# Patient Record
Sex: FEMALE | Race: WHITE | NOT HISPANIC OR LATINO | Employment: FULL TIME | URBAN - METROPOLITAN AREA
[De-identification: names, ages, dates, MRNs, and addresses within clinical notes are randomized per-mention and may not be internally consistent; named-entity substitution may affect disease eponyms.]

---

## 2018-06-18 ENCOUNTER — EMPLOYEE HEALTH (OUTPATIENT)
Dept: OCCUPATIONAL MEDICINE | Facility: CLINIC | Age: 28
End: 2018-06-18

## 2018-06-18 ENCOUNTER — EH NON-PROVIDER (OUTPATIENT)
Dept: OCCUPATIONAL MEDICINE | Facility: CLINIC | Age: 28
End: 2018-06-18

## 2018-06-18 ENCOUNTER — HOSPITAL ENCOUNTER (OUTPATIENT)
Facility: MEDICAL CENTER | Age: 28
End: 2018-06-18
Attending: PREVENTIVE MEDICINE
Payer: COMMERCIAL

## 2018-06-18 VITALS
BODY MASS INDEX: 26.12 KG/M2 | SYSTOLIC BLOOD PRESSURE: 118 MMHG | DIASTOLIC BLOOD PRESSURE: 78 MMHG | HEIGHT: 64 IN | OXYGEN SATURATION: 97 % | TEMPERATURE: 98.8 F | HEART RATE: 63 BPM | WEIGHT: 153 LBS

## 2018-06-18 DIAGNOSIS — Z02.1 PRE-EMPLOYMENT DRUG SCREENING: ICD-10-CM

## 2018-06-18 DIAGNOSIS — Z02.89 VISIT FOR OCCUPATIONAL HEALTH EXAMINATION: ICD-10-CM

## 2018-06-18 LAB
AMP AMPHETAMINE: NORMAL
BAR BARBITURATES: NORMAL
BZO BENZODIAZEPINES: NORMAL
COC COCAINE: NORMAL
INT CON NEG: NORMAL
INT CON POS: NORMAL
MDMA ECSTASY: NORMAL
MET METHAMPHETAMINES: NORMAL
MTD METHADONE: NORMAL
OPI OPIATES: NORMAL
OXY OXYCODONE: NORMAL
PCP PHENCYCLIDINE: NORMAL
POC URINE DRUG SCREEN OCDRS: NORMAL
THC: NORMAL
VZV IGG SER IA-ACNC: 1.89

## 2018-06-18 PROCEDURE — 86787 VARICELLA-ZOSTER ANTIBODY: CPT | Performed by: PREVENTIVE MEDICINE

## 2018-06-18 PROCEDURE — 8915 PR COMPREHENSIVE PHYSICAL: Performed by: PREVENTIVE MEDICINE

## 2018-06-18 PROCEDURE — 86480 TB TEST CELL IMMUN MEASURE: CPT | Performed by: PREVENTIVE MEDICINE

## 2018-06-18 PROCEDURE — 94375 RESPIRATORY FLOW VOLUME LOOP: CPT | Performed by: PREVENTIVE MEDICINE

## 2018-06-18 PROCEDURE — 80305 DRUG TEST PRSMV DIR OPT OBS: CPT | Performed by: PREVENTIVE MEDICINE

## 2018-06-20 LAB
M TB TUBERC IFN-G BLD QL: NEGATIVE
M TB TUBERC IFN-G/MITOGEN IGNF BLD: 0.01
M TB TUBERC IGNF/MITOGEN IGNF CONTROL: 51.07 [IU]/ML
MITOGEN IGNF BCKGRD COR BLD-ACNC: 0.02 [IU]/ML

## 2018-06-22 ENCOUNTER — DOCUMENTATION (OUTPATIENT)
Dept: OCCUPATIONAL MEDICINE | Facility: CLINIC | Age: 28
End: 2018-06-22

## 2018-10-02 ENCOUNTER — IMMUNIZATION (OUTPATIENT)
Dept: OCCUPATIONAL MEDICINE | Facility: CLINIC | Age: 28
End: 2018-10-02

## 2018-10-02 DIAGNOSIS — Z23 NEED FOR VACCINATION: ICD-10-CM

## 2018-10-02 PROCEDURE — 90686 IIV4 VACC NO PRSV 0.5 ML IM: CPT | Performed by: PREVENTIVE MEDICINE

## 2019-01-02 ENCOUNTER — DOCUMENTATION (OUTPATIENT)
Dept: OCCUPATIONAL MEDICINE | Facility: CLINIC | Age: 29
End: 2019-01-02

## 2019-01-02 NOTE — PROGRESS NOTES
Respiratory questionnaire reviewed and signed. See scanned documents.      OK for mask fit event.   Appointment scheduled for 1/4/19 @ 6:30 am. E-mail notification sent.

## 2019-01-03 ENCOUNTER — NON-PROVIDER VISIT (OUTPATIENT)
Dept: URGENT CARE | Facility: PHYSICIAN GROUP | Age: 29
End: 2019-01-03

## 2019-01-03 DIAGNOSIS — Z11.1 SCREENING FOR TUBERCULOSIS: Primary | ICD-10-CM

## 2019-01-03 PROCEDURE — 86580 TB INTRADERMAL TEST: CPT | Performed by: PHYSICIAN ASSISTANT

## 2019-01-03 NOTE — PROGRESS NOTES
Agustín Brito is a 28 y.o. female here for a non-provider visit for PPD placement -- Step 1 of 2    Reason for PPD:  school requirement    1. TB evaluation questionnaire completed by patient? Yes      -  If any answers marked yes did you contact a provider prior to placing? No  2.  Patient notified to return to clinic for reading on: 01/05/2019  3.  PPD Placement documentation completed on TB evaluation questionnaire? Yes  4.  Location of TB evaluation questionnaire filed: back office

## 2019-01-04 ENCOUNTER — EH NON-PROVIDER (OUTPATIENT)
Dept: OCCUPATIONAL MEDICINE | Facility: CLINIC | Age: 29
End: 2019-01-04

## 2019-01-04 DIAGNOSIS — Z02.89 ENCOUNTER FOR OCCUPATIONAL HEALTH EXAMINATION INVOLVING RESPIRATOR: Primary | ICD-10-CM

## 2019-01-04 PROCEDURE — 94375 RESPIRATORY FLOW VOLUME LOOP: CPT | Performed by: PREVENTIVE MEDICINE

## 2019-01-05 ENCOUNTER — NON-PROVIDER VISIT (OUTPATIENT)
Dept: URGENT CARE | Facility: PHYSICIAN GROUP | Age: 29
End: 2019-01-05
Payer: OTHER MISCELLANEOUS

## 2019-01-05 LAB — TB WHEAL 3D P 5 TU DIAM: 0 MM

## 2019-06-11 ENCOUNTER — HOSPITAL ENCOUNTER (EMERGENCY)
Facility: MEDICAL CENTER | Age: 29
End: 2019-06-11
Attending: EMERGENCY MEDICINE
Payer: MEDICAID

## 2019-06-11 VITALS
HEIGHT: 64 IN | DIASTOLIC BLOOD PRESSURE: 73 MMHG | BODY MASS INDEX: 29.77 KG/M2 | RESPIRATION RATE: 16 BRPM | SYSTOLIC BLOOD PRESSURE: 109 MMHG | WEIGHT: 174.38 LBS | TEMPERATURE: 97.7 F | HEART RATE: 76 BPM | OXYGEN SATURATION: 95 %

## 2019-06-11 DIAGNOSIS — M25.562 ACUTE PAIN OF LEFT KNEE: ICD-10-CM

## 2019-06-11 DIAGNOSIS — S89.92XA INJURY OF LEFT KNEE, INITIAL ENCOUNTER: ICD-10-CM

## 2019-06-11 PROCEDURE — 99283 EMERGENCY DEPT VISIT LOW MDM: CPT

## 2019-06-11 NOTE — ED TRIAGE NOTES
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  /79   Pulse 88   Temp 36.8 °C (98.3 °F) (Temporal)   Resp 17   Ht 1.626 m (5' 4\")   Wt 79.1 kg (174 lb 6.1 oz)   SpO2 100%   Pt informed of wait times. Educated on triage process.  Asked to return to triage RN for any new or worsening of symptoms. Thanked for patience.        "

## 2019-06-12 NOTE — ED NOTES
Pt cleared for d/c  Order form for MRI given and MD placed order in EPIC to have out pt   dischg instructions given to pt  Verbally understands  D/c'ed to home in NAD

## 2019-06-12 NOTE — ED PROVIDER NOTES
ED Provider Note    CHIEF COMPLAINT  Chief Complaint   Patient presents with   • T-5000 GLF     L knee after GLF 2 weeks ago   • Knee Injury       HPI  Agustín Brito is a 28 y.o. female who presents complaining of left knee pain after falling at a party about 2 weeks ago.  She has had intermittent swelling to her left knee and some pain going up stairs.  She denies any numbness or tingling distally.  She did get an x-ray of her knee at [facility] a week and a half ago and there were no fractures.  She states that it still intermittently swells and hurts.  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  The patient presents to the ER for an orthopedic referral.  She is complaining of some swelling in the knee pain with range of motion.    REVIEW OF SYSTEMS  No history of poor wound healing diabetes or bony abnormalities     PAST MEDICAL HISTORY  History reviewed. No pertinent past medical history.      SOCIAL HISTORY  Social History     Social History   • Marital status: Single     Spouse name: N/A   • Number of children: N/A   • Years of education: N/A     Social History Main Topics   • Smoking status: Never Smoker   • Smokeless tobacco: Never Used   • Alcohol use 1.2 oz/week     2 Glasses of wine per week   • Drug use: No   • Sexual activity: Not on file     Other Topics Concern   • Not on file     Social History Narrative   • No narrative on file       CURRENT MEDICATIONS  Home Medications     Reviewed by Leona Perry R.N. (Registered Nurse) on 06/11/19 at 1633  Med List Status: Partial   Medication Last Dose Status   fluconazole (DIFLUCAN) 150 MG tablet  Active   metronidazole (METROGEL-VAGINAL) 0.75 % GEL  Active   Norgestim-Eth Estrad Triphasic (TRI-SPRINTEC) 0.18/0.215/0.25 MG-35 MCG TABS  Active                ALLERGIES  Allergies   Allergen Reactions   • Penicillins  "Hives       PHYSICAL EXAM  VITAL SIGNS: /79   Pulse 88   Temp 36.8 °C (98.3 °F) (Temporal)   Resp 17   Ht 1.626 m (5' 4\")   Wt 79.1 kg (174 lb 6.1 oz)   SpO2 100%   BMI 29.93 kg/m²    Constitutional: No distress  Skin: Pink warm and dry without contusions abrasions or erythema  Musculoskeletal: Patient has small effusion to her left knee with tenderness on range of motion.  She has equal strength and sensation in a small amount of an anterior drawer sign.  Distally she is neurovascularly intact with normal strength sensation and tendon function.  Left ankle and left hip are normal to examination.  Vascular: warm to touch good capillary refill   Neurologic: distally neurovascularly intact  Psychiatric: Affect normal    Radiology  None indicated    Medical Decision Making  Differential diagnosis: Knee sprain versus renal knee derangement      I explained to the patient that we do not perform knee MRIs through the emergency department but I could order one as an outpatient.  I will refer her to [facility] orthopedics Dr. Kurtz who is on-call for us today to follow-up on these results and perform any surgeries necessary and recheck her knee.  The patient understands this and she will be discharged home in stable condition.  She is to continue to wear the knee brace that she has been wearing and avoid heavy exertion on that leg.  She should take Motrin and ice and elevate the leg for pain and swelling.        Patient has had high blood pressure while in the emergency department, felt likely secondary to medical condition. Counseled patient to monitor blood pressure at home and follow up with primary care physician.    Ravindra Minor M.D.  [address]  [address]  [facility] [id number] [phone]    Call in 1 day  to establish care, for recheck    Current Outpatient Prescriptions   Medication Sig Dispense Refill   • fluconazole (DIFLUCAN) 150 MG tablet Take 1 tablet by mouth every 4 days 4 " Tab 0   • metronidazole (METROGEL-VAGINAL) 0.75 % GEL Insert 1 Applicator in vagina every bedtime. 1 Tube 0   • Norgestim-Eth Estrad Triphasic (TRI-SPRINTEC) 0.18/0.215/0.25 MG-35 MCG TABS Take  by mouth.             Diagnosis  1. Injury of left knee, initial encounter

## 2019-06-20 ENCOUNTER — HOSPITAL ENCOUNTER (OUTPATIENT)
Dept: RADIOLOGY | Facility: MEDICAL CENTER | Age: 29
End: 2019-06-20
Attending: EMERGENCY MEDICINE
Payer: MEDICAID

## 2019-06-20 DIAGNOSIS — M25.562 ACUTE PAIN OF LEFT KNEE: ICD-10-CM

## 2019-06-20 PROCEDURE — 73721 MRI JNT OF LWR EXTRE W/O DYE: CPT | Mod: LT

## 2019-07-08 DIAGNOSIS — Z01.812 PRE-OPERATIVE LABORATORY EXAMINATION: ICD-10-CM

## 2019-07-08 LAB — HCG SERPL QL: NEGATIVE

## 2019-07-08 PROCEDURE — 36415 COLL VENOUS BLD VENIPUNCTURE: CPT

## 2019-07-08 PROCEDURE — 84703 CHORIONIC GONADOTROPIN ASSAY: CPT

## 2019-07-08 PROCEDURE — 87640 STAPH A DNA AMP PROBE: CPT | Mod: 25

## 2019-07-08 PROCEDURE — 87641 MR-STAPH DNA AMP PROBE: CPT

## 2019-07-08 RX ORDER — IBUPROFEN 200 MG
200 TABLET ORAL EVERY 6 HOURS PRN
COMMUNITY
End: 2021-03-17

## 2019-07-08 NOTE — OR NURSING
"Preadmit appointment: \" Preparing for your Procedure information\" sheet given to patient with verbal and written instructions. Patient instructed to continue prescribed medications through the day before surgery, instructed to take the following medications the day of surgery per anesthesia protocol:  None.  Pt states no issues with anesthesia or family history.   All questions answered.  "

## 2019-07-09 ENCOUNTER — HOSPITAL ENCOUNTER (OUTPATIENT)
Facility: MEDICAL CENTER | Age: 29
End: 2019-07-09
Attending: ORTHOPAEDIC SURGERY | Admitting: ORTHOPAEDIC SURGERY
Payer: MEDICAID

## 2019-07-09 ENCOUNTER — ANESTHESIA (OUTPATIENT)
Dept: SURGERY | Facility: MEDICAL CENTER | Age: 29
End: 2019-07-09
Payer: MEDICAID

## 2019-07-09 ENCOUNTER — ANESTHESIA EVENT (OUTPATIENT)
Dept: SURGERY | Facility: MEDICAL CENTER | Age: 29
End: 2019-07-09
Payer: MEDICAID

## 2019-07-09 VITALS
OXYGEN SATURATION: 96 % | HEIGHT: 64 IN | SYSTOLIC BLOOD PRESSURE: 143 MMHG | DIASTOLIC BLOOD PRESSURE: 89 MMHG | HEART RATE: 84 BPM | TEMPERATURE: 97.9 F | WEIGHT: 171.96 LBS | RESPIRATION RATE: 18 BRPM | BODY MASS INDEX: 29.36 KG/M2

## 2019-07-09 LAB
SCCMEC + MECA PNL NOSE NAA+PROBE: NEGATIVE
SCCMEC + MECA PNL NOSE NAA+PROBE: POSITIVE

## 2019-07-09 PROCEDURE — 160029 HCHG SURGERY MINUTES - 1ST 30 MINS LEVEL 4: Performed by: ORTHOPAEDIC SURGERY

## 2019-07-09 PROCEDURE — C1762 CONN TISS, HUMAN(INC FASCIA): HCPCS | Performed by: ORTHOPAEDIC SURGERY

## 2019-07-09 PROCEDURE — 700101 HCHG RX REV CODE 250

## 2019-07-09 PROCEDURE — 700105 HCHG RX REV CODE 258: Performed by: ORTHOPAEDIC SURGERY

## 2019-07-09 PROCEDURE — 700111 HCHG RX REV CODE 636 W/ 250 OVERRIDE (IP): Performed by: ANESTHESIOLOGY

## 2019-07-09 PROCEDURE — 160002 HCHG RECOVERY MINUTES (STAT): Performed by: ORTHOPAEDIC SURGERY

## 2019-07-09 PROCEDURE — 160046 HCHG PACU - 1ST 60 MINS PHASE II: Performed by: ORTHOPAEDIC SURGERY

## 2019-07-09 PROCEDURE — 700101 HCHG RX REV CODE 250: Performed by: ORTHOPAEDIC SURGERY

## 2019-07-09 PROCEDURE — 160048 HCHG OR STATISTICAL LEVEL 1-5: Performed by: ORTHOPAEDIC SURGERY

## 2019-07-09 PROCEDURE — 500028 HCHG ARTHROWAND TURBOVAC 3.5/90 SUCT.: Performed by: ORTHOPAEDIC SURGERY

## 2019-07-09 PROCEDURE — 500562 HCHG FIBERWIRE: Performed by: ORTHOPAEDIC SURGERY

## 2019-07-09 PROCEDURE — 501838 HCHG SUTURE GENERAL: Performed by: ORTHOPAEDIC SURGERY

## 2019-07-09 PROCEDURE — C1713 ANCHOR/SCREW BN/BN,TIS/BN: HCPCS | Performed by: ORTHOPAEDIC SURGERY

## 2019-07-09 PROCEDURE — 160047 HCHG PACU  - EA ADDL 30 MINS PHASE II: Performed by: ORTHOPAEDIC SURGERY

## 2019-07-09 PROCEDURE — 160035 HCHG PACU - 1ST 60 MINS PHASE I: Performed by: ORTHOPAEDIC SURGERY

## 2019-07-09 PROCEDURE — 160036 HCHG PACU - EA ADDL 30 MINS PHASE I: Performed by: ORTHOPAEDIC SURGERY

## 2019-07-09 PROCEDURE — 700101 HCHG RX REV CODE 250: Performed by: ANESTHESIOLOGY

## 2019-07-09 PROCEDURE — A9270 NON-COVERED ITEM OR SERVICE: HCPCS | Performed by: ANESTHESIOLOGY

## 2019-07-09 PROCEDURE — 160025 RECOVERY II MINUTES (STATS): Performed by: ORTHOPAEDIC SURGERY

## 2019-07-09 PROCEDURE — 502580 HCHG PACK, KNEE ARTHROSCOPY: Performed by: ORTHOPAEDIC SURGERY

## 2019-07-09 PROCEDURE — 160009 HCHG ANES TIME/MIN: Performed by: ORTHOPAEDIC SURGERY

## 2019-07-09 PROCEDURE — 700102 HCHG RX REV CODE 250 W/ 637 OVERRIDE(OP): Performed by: ANESTHESIOLOGY

## 2019-07-09 PROCEDURE — 160041 HCHG SURGERY MINUTES - EA ADDL 1 MIN LEVEL 4: Performed by: ORTHOPAEDIC SURGERY

## 2019-07-09 PROCEDURE — 500423 HCHG DRESSING, ABD COMBINE: Performed by: ORTHOPAEDIC SURGERY

## 2019-07-09 PROCEDURE — 700111 HCHG RX REV CODE 636 W/ 250 OVERRIDE (IP): Performed by: ORTHOPAEDIC SURGERY

## 2019-07-09 DEVICE — SCREW BIOSURE 10 X 25MM: Type: IMPLANTABLE DEVICE | Site: KNEE | Status: FUNCTIONAL

## 2019-07-09 DEVICE — DEVICE ULTRABUTTON ADJUSTABLE FIXATION: Type: IMPLANTABLE DEVICE | Site: KNEE | Status: FUNCTIONAL

## 2019-07-09 DEVICE — GRAFT SOFT TISSUE ANTERIOR TIBIALIS TENDON 23CM: Type: IMPLANTABLE DEVICE | Site: KNEE | Status: FUNCTIONAL

## 2019-07-09 RX ORDER — GABAPENTIN 300 MG/1
300 CAPSULE ORAL ONCE
Status: COMPLETED | OUTPATIENT
Start: 2019-07-09 | End: 2019-07-09

## 2019-07-09 RX ORDER — ONDANSETRON 2 MG/ML
INJECTION INTRAMUSCULAR; INTRAVENOUS PRN
Status: DISCONTINUED | OUTPATIENT
Start: 2019-07-09 | End: 2019-07-09 | Stop reason: SURG

## 2019-07-09 RX ORDER — MEPERIDINE HYDROCHLORIDE 25 MG/ML
12.5 INJECTION INTRAMUSCULAR; INTRAVENOUS; SUBCUTANEOUS
Status: DISCONTINUED | OUTPATIENT
Start: 2019-07-09 | End: 2019-07-09 | Stop reason: HOSPADM

## 2019-07-09 RX ORDER — OXYCODONE HCL 5 MG/5 ML
5 SOLUTION, ORAL ORAL
Status: DISCONTINUED | OUTPATIENT
Start: 2019-07-09 | End: 2019-07-09 | Stop reason: HOSPADM

## 2019-07-09 RX ORDER — HYDROMORPHONE HYDROCHLORIDE 1 MG/ML
0.1 INJECTION, SOLUTION INTRAMUSCULAR; INTRAVENOUS; SUBCUTANEOUS
Status: DISCONTINUED | OUTPATIENT
Start: 2019-07-09 | End: 2019-07-09 | Stop reason: HOSPADM

## 2019-07-09 RX ORDER — SODIUM CHLORIDE, SODIUM LACTATE, POTASSIUM CHLORIDE, CALCIUM CHLORIDE 600; 310; 30; 20 MG/100ML; MG/100ML; MG/100ML; MG/100ML
INJECTION, SOLUTION INTRAVENOUS CONTINUOUS
Status: DISCONTINUED | OUTPATIENT
Start: 2019-07-09 | End: 2019-07-09 | Stop reason: HOSPADM

## 2019-07-09 RX ORDER — OXYCODONE HCL 20 MG/1
TABLET, FILM COATED, EXTENDED RELEASE ORAL
Status: DISCONTINUED
Start: 2019-07-09 | End: 2019-07-09 | Stop reason: HOSPADM

## 2019-07-09 RX ORDER — LABETALOL HYDROCHLORIDE 5 MG/ML
5 INJECTION, SOLUTION INTRAVENOUS
Status: DISCONTINUED | OUTPATIENT
Start: 2019-07-09 | End: 2019-07-09 | Stop reason: HOSPADM

## 2019-07-09 RX ORDER — BUPIVACAINE HYDROCHLORIDE 5 MG/ML
INJECTION, SOLUTION PERINEURAL
Status: DISCONTINUED | OUTPATIENT
Start: 2019-07-09 | End: 2019-07-09 | Stop reason: HOSPADM

## 2019-07-09 RX ORDER — CELECOXIB 200 MG/1
200 CAPSULE ORAL ONCE
Status: COMPLETED | OUTPATIENT
Start: 2019-07-09 | End: 2019-07-09

## 2019-07-09 RX ORDER — OXYCODONE HCL 20 MG/1
20 TABLET, FILM COATED, EXTENDED RELEASE ORAL ONCE
Status: COMPLETED | OUTPATIENT
Start: 2019-07-09 | End: 2019-07-09

## 2019-07-09 RX ORDER — HYDROMORPHONE HYDROCHLORIDE 1 MG/ML
0.2 INJECTION, SOLUTION INTRAMUSCULAR; INTRAVENOUS; SUBCUTANEOUS
Status: DISCONTINUED | OUTPATIENT
Start: 2019-07-09 | End: 2019-07-09 | Stop reason: HOSPADM

## 2019-07-09 RX ORDER — OXYCODONE HCL 5 MG/5 ML
10 SOLUTION, ORAL ORAL
Status: DISCONTINUED | OUTPATIENT
Start: 2019-07-09 | End: 2019-07-09 | Stop reason: HOSPADM

## 2019-07-09 RX ORDER — HYDROMORPHONE HYDROCHLORIDE 1 MG/ML
0.4 INJECTION, SOLUTION INTRAMUSCULAR; INTRAVENOUS; SUBCUTANEOUS
Status: DISCONTINUED | OUTPATIENT
Start: 2019-07-09 | End: 2019-07-09 | Stop reason: HOSPADM

## 2019-07-09 RX ORDER — ACETAMINOPHEN 500 MG
TABLET ORAL
Status: DISCONTINUED
Start: 2019-07-09 | End: 2019-07-09 | Stop reason: HOSPADM

## 2019-07-09 RX ORDER — ACETAMINOPHEN 500 MG
1000 TABLET ORAL ONCE
Status: COMPLETED | OUTPATIENT
Start: 2019-07-09 | End: 2019-07-09

## 2019-07-09 RX ORDER — CEFAZOLIN SODIUM 1 G/3ML
INJECTION, POWDER, FOR SOLUTION INTRAMUSCULAR; INTRAVENOUS PRN
Status: DISCONTINUED | OUTPATIENT
Start: 2019-07-09 | End: 2019-07-09 | Stop reason: SURG

## 2019-07-09 RX ORDER — DIPHENHYDRAMINE HYDROCHLORIDE 50 MG/ML
12.5 INJECTION INTRAMUSCULAR; INTRAVENOUS
Status: DISCONTINUED | OUTPATIENT
Start: 2019-07-09 | End: 2019-07-09 | Stop reason: HOSPADM

## 2019-07-09 RX ORDER — GABAPENTIN 300 MG/1
CAPSULE ORAL
Status: DISCONTINUED
Start: 2019-07-09 | End: 2019-07-09 | Stop reason: HOSPADM

## 2019-07-09 RX ORDER — ONDANSETRON 2 MG/ML
4 INJECTION INTRAMUSCULAR; INTRAVENOUS
Status: DISCONTINUED | OUTPATIENT
Start: 2019-07-09 | End: 2019-07-09 | Stop reason: HOSPADM

## 2019-07-09 RX ORDER — DEXAMETHASONE SODIUM PHOSPHATE 4 MG/ML
INJECTION, SOLUTION INTRA-ARTICULAR; INTRALESIONAL; INTRAMUSCULAR; INTRAVENOUS; SOFT TISSUE PRN
Status: DISCONTINUED | OUTPATIENT
Start: 2019-07-09 | End: 2019-07-09 | Stop reason: SURG

## 2019-07-09 RX ORDER — BACITRACIN 50000 [IU]/1
INJECTION, POWDER, FOR SOLUTION INTRAMUSCULAR
Status: DISCONTINUED | OUTPATIENT
Start: 2019-07-09 | End: 2019-07-09 | Stop reason: HOSPADM

## 2019-07-09 RX ORDER — CELECOXIB 200 MG/1
CAPSULE ORAL
Status: DISCONTINUED
Start: 2019-07-09 | End: 2019-07-09 | Stop reason: HOSPADM

## 2019-07-09 RX ADMIN — PROPOFOL 140 MG: 10 INJECTION, EMULSION INTRAVENOUS at 14:20

## 2019-07-09 RX ADMIN — SODIUM CHLORIDE, POTASSIUM CHLORIDE, SODIUM LACTATE AND CALCIUM CHLORIDE: 600; 310; 30; 20 INJECTION, SOLUTION INTRAVENOUS at 12:21

## 2019-07-09 RX ADMIN — ACETAMINOPHEN 1000 MG: 500 TABLET, FILM COATED ORAL at 13:09

## 2019-07-09 RX ADMIN — FENTANYL CITRATE 50 MCG: 50 INJECTION, SOLUTION INTRAMUSCULAR; INTRAVENOUS at 15:40

## 2019-07-09 RX ADMIN — FENTANYL CITRATE 25 MCG: 50 INJECTION INTRAMUSCULAR; INTRAVENOUS at 16:30

## 2019-07-09 RX ADMIN — GABAPENTIN 300 MG: 300 CAPSULE ORAL at 13:10

## 2019-07-09 RX ADMIN — FENTANYL CITRATE 50 MCG: 50 INJECTION, SOLUTION INTRAMUSCULAR; INTRAVENOUS at 14:55

## 2019-07-09 RX ADMIN — FENTANYL CITRATE 50 MCG: 50 INJECTION, SOLUTION INTRAMUSCULAR; INTRAVENOUS at 14:20

## 2019-07-09 RX ADMIN — FENTANYL CITRATE 50 MCG: 50 INJECTION, SOLUTION INTRAMUSCULAR; INTRAVENOUS at 14:30

## 2019-07-09 RX ADMIN — FENTANYL CITRATE 50 MCG: 50 INJECTION, SOLUTION INTRAMUSCULAR; INTRAVENOUS at 15:16

## 2019-07-09 RX ADMIN — LIDOCAINE HYDROCHLORIDE 0.5 ML: 10 INJECTION, SOLUTION INFILTRATION; PERINEURAL at 12:21

## 2019-07-09 RX ADMIN — SODIUM CHLORIDE, POTASSIUM CHLORIDE, SODIUM LACTATE AND CALCIUM CHLORIDE: 600; 310; 30; 20 INJECTION, SOLUTION INTRAVENOUS at 15:25

## 2019-07-09 RX ADMIN — FENTANYL CITRATE 25 MCG: 50 INJECTION INTRAMUSCULAR; INTRAVENOUS at 16:14

## 2019-07-09 RX ADMIN — LIDOCAINE HYDROCHLORIDE 40 MG: 20 INJECTION, SOLUTION INFILTRATION; PERINEURAL at 14:20

## 2019-07-09 RX ADMIN — FENTANYL CITRATE 25 MCG: 50 INJECTION INTRAMUSCULAR; INTRAVENOUS at 16:20

## 2019-07-09 RX ADMIN — FENTANYL CITRATE 25 MCG: 50 INJECTION INTRAMUSCULAR; INTRAVENOUS at 16:39

## 2019-07-09 RX ADMIN — ONDANSETRON 4 MG: 2 INJECTION INTRAMUSCULAR; INTRAVENOUS at 15:32

## 2019-07-09 RX ADMIN — OXYCODONE HYDROCHLORIDE 20 MG: 20 TABLET, FILM COATED, EXTENDED RELEASE ORAL at 13:09

## 2019-07-09 RX ADMIN — MIDAZOLAM HYDROCHLORIDE 2 MG: 1 INJECTION, SOLUTION INTRAMUSCULAR; INTRAVENOUS at 14:16

## 2019-07-09 RX ADMIN — DEXAMETHASONE SODIUM PHOSPHATE 8 MG: 4 INJECTION, SOLUTION INTRAMUSCULAR; INTRAVENOUS at 14:25

## 2019-07-09 RX ADMIN — SODIUM CHLORIDE, POTASSIUM CHLORIDE, SODIUM LACTATE AND CALCIUM CHLORIDE: 600; 310; 30; 20 INJECTION, SOLUTION INTRAVENOUS at 14:16

## 2019-07-09 RX ADMIN — CEFAZOLIN 2 G: 1 INJECTION, POWDER, FOR SOLUTION INTRAVENOUS at 14:24

## 2019-07-09 RX ADMIN — CELECOXIB 200 MG: 200 CAPSULE ORAL at 13:10

## 2019-07-09 ASSESSMENT — PAIN SCALES - GENERAL: PAIN_LEVEL: 0

## 2019-07-09 NOTE — ANESTHESIA PREPROCEDURE EVALUATION
Relevant Problems   No relevant active problems       Physical Exam    Airway   Mallampati: II  TM distance: >3 FB  Neck ROM: full       Cardiovascular - normal exam  Rhythm: regular  Rate: normal  (-) murmur     Dental - normal exam           Pulmonary - normal exam  Breath sounds clear to auscultation     Abdominal    Neurological - normal exam                 Anesthesia Plan    ASA 1       Plan - general             Induction: intravenous    Postoperative Plan: Postoperative administration of opioids is intended.    Pertinent diagnostic labs and testing reviewed    Informed Consent:    Anesthetic plan and risks discussed with patient.    Use of blood products discussed with: patient whom consented to blood products.

## 2019-07-09 NOTE — OR SURGEON
Immediate Post OP Note    PreOp Diagnosis: left knee ACL tear    PostOp Diagnosis: left knee acl tear, lateral meniscus tear    Procedure(s):  RECONSTRUCTION, KNEE, ACL, ARTHROSCOPIC - WITH TIBIALIS ANTERIOR ALLOGRAFT - Wound Class: Clean  MENISCECTOMY, KNEE, LATERAL  -PARTIAL - Wound Class: Clean    Surgeon(s):  Giovanni Rivera M.D.    Anesthesiologist/Type of Anesthesia:  Anesthesiologist: Kenji Alan M.D./General    Surgical Staff:  Assistant: Zoey Rolle CFA  Circulator: Nette Ovalle R.N.  Scrub Person: Anton Crews    Specimens removed if any:  * No specimens in log *    Estimated Blood Loss: minimal     Findings: acl tear, lateral meniscus tear    Complications: no apparent         7/9/2019 4:07 PM Giovanni Rivera M.D.

## 2019-07-09 NOTE — ANESTHESIA PROCEDURE NOTES
Airway  Date/Time: 7/9/2019 2:21 PM  Performed by: PRIYANK DUARTE  Authorized by: PRIYANK DUARTE     Location:  OR  Urgency:  Elective  Difficult Airway: No    Indications for Airway Management:  Anesthesia  Spontaneous Ventilation: absent    Sedation Level:  Deep  Preoxygenated: Yes    Mask Difficulty Assessment:  0 - not attempted  Final Airway Type:  Supraglottic airway  Final Supraglottic Airway:  Standard LMA  SGA Size:  4  Number of Attempts at Approach:  1

## 2019-07-09 NOTE — DISCHARGE INSTRUCTIONS
ACTIVITY: Rest and take it easy for the first 24 hours.  A responsible adult is recommended to remain with you during that time.  It is normal to feel sleepy.  We encourage you to not do anything that requires balance, judgment or coordination.    MILD FLU-LIKE SYMPTOMS ARE NORMAL. YOU MAY EXPERIENCE GENERALIZED MUSCLE ACHES, THROAT IRRITATION, HEADACHE AND/OR SOME NAUSEA.    FOR 24 HOURS DO NOT:  Drive, operate machinery or run household appliances.  Drink beer or alcoholic beverages.   Make important decisions or sign legal documents.    SPECIAL INSTRUCTIONS: **Toe touch weight bearing left leg after block wears off.  Use crutches. *Elevate left leg with Polar ice.  Please read and follow [facility] discharge instruction sheet.    DIET: To avoid nausea, slowly advance diet as tolerated, avoiding spicy or greasy foods for the first day.  Add more substantial food to your diet according to your physician's instructions.  Babies can be fed formula or breast milk as soon as they are hungry.  INCREASE FLUIDS AND FIBER TO AVOID CONSTIPATION.        FOLLOW-UP APPOINTMENT:  A follow-up appointment should be arranged with your doctor . Call if not previously scheduled.     You should CALL YOUR PHYSICIAN if you develop:  Fever greater than 101 degrees F.  Pain not relieved by medication, or persistent nausea or vomiting.  Excessive bleeding (blood soaking through dressing) or unexpected drainage from the wound.  Extreme redness or swelling around the incision site, drainage of pus or foul smelling drainage.  Inability to urinate or empty your bladder within 8 hours.  Problems with breathing or chest pain.    You should call 911 if you develop problems with breathing or chest pain.  If you are unable to contact your doctor or surgical center, you should go to the nearest emergency room or urgent care center.  Physician's telephone #: *[phone]**    If any questions arise, call your doctor.  If your doctor is not available, please  feel free to call the Surgical Center at [phone].  The Center is open Monday through Friday from 7AM to 7PM.  You can also call the HEALTH HOTLINE open 24 hours/day, 7 days/week and speak to a nurse at [phone], or toll free at [phone].    A registered nurse may call you a few days after your surgery to see how you are doing after your procedure.    MEDICATIONS: Resume taking daily medication.  Take prescribed pain medication with food.  If no medication is prescribed, you may take non-aspirin pain medication if needed.  PAIN MEDICATION CAN BE VERY CONSTIPATING.  Take a stool softener or laxative such as senokot, pericolace, or milk of magnesia if needed.    Prescription given for **previously given*.  Last pain medication given at **no oral pain meds in PACU*.    If your physician has prescribed pain medication that includes Acetaminophen (Tylenol), do not take additional Acetaminophen (Tylenol) while taking the prescribed medication.    Depression / Suicide Risk    As you are discharged from this [facility] facility, it is important to learn how to keep safe from harming yourself.    Recognize the warning signs:  · Abrupt changes in personality, positive or negative- including increase in energy   · Giving away possessions  · Change in eating patterns- significant weight changes-  positive or negative  · Change in sleeping patterns- unable to sleep or sleeping all the time   · Unwillingness or inability to communicate  · Depression  · Unusual sadness, discouragement and loneliness  · Talk of wanting to die  · Neglect of personal appearance   · Rebelliousness- reckless behavior  · Withdrawal from people/activities they love  · Confusion- inability to concentrate     If you or a loved one observes any of these behaviors or has concerns about self-harm, here's what you can do:  · Talk about it- your feelings and reasons for harming yourself  · Remove any means that you might use to hurt yourself  (examples: pills, rope, extension cords, firearm)  · Get professional help from the community (Mental Health, Substance Abuse, psychological counseling)  · Do not be alone:Call your Safe Contact- someone whom you trust who will be there for you.  · Call your local CRISIS HOTLINE 018-4152 or 238-846-7306  · Call your local Children's Mobile Crisis Response Team [place] (538) 189-6001 or www.MoveEZ  · Call the toll free National Suicide Prevention Hotlines   · National Suicide Prevention Lifeline 319-857-CONL (0484)  National Hope Line Network 800-SUICIDE (193-6276)    Peripheral Nerve Block Discharge Instructions from Same Day Surgery and Inpatient :    What to Expect - Lower Extremity  · The block may cause you to experience numbness and weakness in your {LEG LOCATION PNB:380315} on the same side as your surgery  · Numbness, tingling and / or weakness are all normal. For some people, this may be an unpleasant sensation  · These issues will be resolved when the local anesthetic wears off   · You may experience numbness and tingling in your thigh on the same side as your surgery if the block medicine was injected at your groin area  · Numbness will make it difficult to walk  · You may have problems with balance and walking so be very careful   · Follow your surgeon's direction regarding weight bearing on your surgical limb  · Be very careful with your numb limb  Precautions  · The numbness may affect your balance  · Be careful when walking or moving around  · Your leg may be weak: be very careful putting weight on it  · If your surgeon did not specify a time, you should not bear weight for 24 hours  · Be sure to ask for help when you need it  · It is better to have help than to fall and hurt yourself  Prevent Injury  · Protect the limb like a baby  · Beware of exposing your limb to extreme heat or cold or trauma  · The limb may be injured without you noticing because it is numb  · Keep the limb  "elevated whenever possible  · Do not sleep on the limb  · Change the position of the limb regularly  · Avoid putting pressure on your surgical limb  Pain Control  · The initial block on the day of surgery will make your extremity feel \"numb\"  · Any consecutive injection including prior to discharge from the hospital will make your extremity feel \"numb\"  · You may feel an aching or burning when the local anesthesia starts to wear off  · Take pain pills as prescribed by your surgeon  · Call your surgeon or anesthesiologist if you do not have adequate pain control  ·   "

## 2019-07-09 NOTE — ANESTHESIA TIME REPORT
Anesthesia Start and Stop Event Times     Date Time Event    7/9/2019 1416 Anesthesia Start     1556 Anesthesia Stop        Responsible Staff  07/09/19    Name Role Begin End    Kenji Alan M.D. Anesth 1416 1556        Preop Diagnosis (Free Text):  Pre-op Diagnosis     RUPTURE OF ANTERIOR CRUCIATE LIGAMENT left knee        Preop Diagnosis (Codes):  Diagnosis Information     Diagnosis Code(s):         Post op Diagnosis  Rupture of anterior cruciate ligament of left knee, initial encounter      Premium Reason  A. 3PM - 7AM    Comments:

## 2019-07-09 NOTE — ANESTHESIA QCDR
2019 [facility] Clinical Data Registry (for Quality Improvement)     Postoperative nausea/vomiting risk protocol (Adult = 18 yrs and Pediatric 3-17 yrs)- (430 and 463)  General inhalation anesthetic (NOT TIVA) with PONV risk factors: Yes  Provision of anti-emetic therapy with at least 2 different classes of agents: Yes   Patient DID NOT receive anti-emetic therapy and reason is documented in Medical Record:  N/A    Multimodal Pain Management- (AQI59)  Patient undergoing Elective Surgery (i.e. Outpatient, or ASC, or Prescheduled Surgery prior to Hospital Admission): Yes  Use of Multimodal Pain Management, two or more drugs and/or interventions, NOT including systemic opioids: Yes   Exception: Documented allergy to multiple classes of analgesics:  N/A    PACU assessment of acute postoperative pain prior to Anesthesia Care End- Applies to Patients Age = 18- (ABG7)  Initial PACU pain score is which of the following: < 7/10  Patient unable to report pain score: N/A    Post-anesthetic transfer of care checklist/protocol to PACU/ICU- (426 and 427)  Upon conclusion of case, patient transferred to which of the following locations: PACU/Non-ICU  Use of transfer checklist/protocol: Yes  Exclusion: Service Performed in Patient Hospital Room (and thus did not require transfer): N/A    PACU Reintubation- (AQI31)  General anesthesia requiring endotracheal intubation (ETT) along with subsequent extubation in OR or PACU: No  Required reintubation in the PACU: N/A  Extubation was a planned trial documented in the medical record prior to removal of the original airway device: N/A    Unplanned admission to ICU related to anesthesia service up through end of PACU care- (MD51)  Unplanned admission to ICU (not initially anticipated at anesthesia start time): No

## 2019-07-10 NOTE — OR NURSING
1705-arrived pacu 2 vss, assisted to dress and into recliner. dsg CDI, immobilizer in place and locked. Pt denies intolerable pain or nausea. Family to chairside, d/c instructions given with good understanding.  1800- pt d/cd with family via wc tolerated well. Denies need to void at present.

## 2019-07-10 NOTE — OP REPORT
DATE OF SERVICE:  07/09/2019    PREOPERATIVE DIAGNOSIS:  Left knee anterior cruciate ligament tear.    POSTOPERATIVE DIAGNOSIS:  Left knee anterior cruciate ligament tear with   lateral meniscus tear.    PROCEDURES PERFORMED:  1.  Left knee ACL arthroscopic reconstruction with tibialis anterior   allograft.  2.  Left knee arthroscopy with partial lateral meniscectomy.    SURGEON:  Giovanni Rivera MD    ASSISTANT:  Zoey Rolle, certified first assist.    ANESTHESIA:  General plus local and block.    ANESTHESIOLOGIST:  Kenji Alan MD    BLOOD LOSS:  Minimal.    TOURNIQUET USE:  None.    COMPLICATIONS:  None apparent.    DISPOSITION:  PACU.    CONDITION:  Stable.    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  Physical exam was consistent with ACL tear.  MRI confirmed   ACL tear.  We discussed treatment options.  She elected to proceed with an ACL   reconstruction.  We discussed graft options.  She elected to proceed with ACL   soft tissue allograft.  We discussed risks, benefits, and rationale.  Risks   including but not limited to infection, neurovascular injury, DVT, PE, cardiac   arrest, complications of anesthesia, retear of ACL, progressive injury of the   meniscus, need for further surgery, inability to return to pre-injury state,   need for postoperative physical therapy.  Informed consent was signed and   placed on the chart.  All of her questions were answered.  No guarantees   implied or given.    TECHNIQUE:  Both the patient and I agreed as to the correct operative   extremity.  The left knee was signed and marked in the preop holding.  She was   given 2 g IV Ancef prophylaxis.  I consulted Dr. Kenji Alan of anesthesia   regarding perioperative pain management.  He consented the patient for an   adductor canal block, which was carried out under sterile  technique without   complication.  She was taken to the operative suite.  After adequate   anesthesia, timeout was taken by all in the room to identify the correct   patient, limb, and procedure.  Examination under anesthesia showed good range   of motion for the left knee, slight hyperextension of the right relative to   the left.  Positive Lachman's, positive pivot shift with mechanical symptoms.    Stable to varus and valgus stress at 0 and 30 degrees.  Tourniquet was   placed, not inflated.  Left leg was sterilely prepped and draped in standard   fashion.  Standard arthroscopic portals were established.  Findings were as   follows:  Articular cartilage was intact throughout the knee.  Medial meniscus   was in good condition.  ACL was torn.  PCL intact.  Lateral meniscus had a   radial tear at the mid body and extension into a longitudinal tear that went   into the posterior body and to the anterior body.  Arthroscopic shaver was   used to perform a partial lateral meniscectomy along with the shaver.  At the   back table, Zoey Rolle, certified first assist, prepared a tibialis   anterior allograft to a size 9.5.  While this was being done, the remnant of   the remaining ACL stump was removed.  The anatomic footprint on the femur and   tibia were noted.  Guidepin was used with an ACL tibial guide to put a Beath   pin in the center aspect of the tibial insertion of the ACL.  This was   overdrilled with a 6 mm, then 9.5 mm.  Excess debris was removed.  Back wall   was rasped and a Jay was used to clear soft tissue over the tibial tunnel.    In a hyperflexed position, a 7 mm over the top guide was used to put a   guidepin in the femoral insertion of the ACL.  This was overdrilled with a 4.5   mm Endobutton bicortically and then to a depth of 22 mm with a 9.5 mm acorn   reamer.  Excess debris was removed.  Back wall was verified to be in good   location.  The graft was passed and seated.  It was isometric with  cycling and   I was able to achieve full extension.  Following this, the graft was cycled.    It was then pulled tight.  In full extension, a 10x25 BioComposite screw was   placed in the tibial tunnel, followed by backup fixation with the sutures from   the tibial graft being placed into a Bioraptor footprint.  After this was   done, reevaluation showed full range of motion of the knee.  Graft was stable   on probing.  There was no remaining arthroscopic debris inside the knee.    Wounds were irrigated.  Tibial incision was closed with 2-0 Vicryl and nylon.    Portals were closed with nylon.  A 0.5% Marcaine plain was injected.    Xeroform soft compressive dressing and PolarCare were applied with a brace.    Counts were correct.  No apparent complications.  The patient was transferred   to recovery in stable condition.  In recovery, she had 2+ dorsalis pedis   pulse, toes were pink and warm with brisk capillary refill.  She was able to   dorsiflex and plantar flex, intact to light touch.    Zoey Rolle, certified first assist, was essential for successful   completion of the case.  It could not have been done without her.       ____________________________________     MD KJ Joy / FRANCISCA    DD:  07/09/2019 16:12:41  DT:  07/09/2019 17:25:21    D#:  [id number]  Job#:  [id number]

## 2019-07-10 NOTE — OR NURSING
Respirations easy.  Left leg elevated with Polar Ice on.  Left foot warm, toes pink and mobile with brisk cap refill. Palpable pedal pulses. No nausea.

## 2020-04-08 ENCOUNTER — EH NON-PROVIDER (OUTPATIENT)
Dept: OCCUPATIONAL MEDICINE | Facility: CLINIC | Age: 30
End: 2020-04-08

## 2020-04-08 PROCEDURE — 94375 RESPIRATORY FLOW VOLUME LOOP: CPT | Performed by: PHYSICIAN ASSISTANT

## 2020-05-14 ENCOUNTER — HOSPITAL ENCOUNTER (OUTPATIENT)
Facility: MEDICAL CENTER | Age: 30
End: 2020-05-14
Attending: PREVENTIVE MEDICINE
Payer: COMMERCIAL

## 2020-05-14 ENCOUNTER — NON-PROVIDER VISIT (OUTPATIENT)
Dept: URGENT CARE | Facility: CLINIC | Age: 30
End: 2020-05-14

## 2020-05-14 ENCOUNTER — OFFICE VISIT (OUTPATIENT)
Dept: URGENT CARE | Facility: CLINIC | Age: 30
End: 2020-05-14

## 2020-05-14 VITALS
HEART RATE: 91 BPM | BODY MASS INDEX: 29.19 KG/M2 | OXYGEN SATURATION: 96 % | TEMPERATURE: 98.9 F | DIASTOLIC BLOOD PRESSURE: 64 MMHG | SYSTOLIC BLOOD PRESSURE: 126 MMHG | HEIGHT: 64 IN | WEIGHT: 171 LBS | RESPIRATION RATE: 12 BRPM

## 2020-05-14 DIAGNOSIS — Z02.1 PRE-EMPLOYMENT HEALTH SCREENING EXAMINATION: ICD-10-CM

## 2020-05-14 DIAGNOSIS — Z02.1 PRE-EMPLOYMENT HEALTH SCREENING EXAMINATION: Primary | ICD-10-CM

## 2020-05-14 PROCEDURE — 86480 TB TEST CELL IMMUN MEASURE: CPT | Performed by: PREVENTIVE MEDICINE

## 2020-05-14 PROCEDURE — 8915 PR COMPREHENSIVE PHYSICAL: Performed by: PREVENTIVE MEDICINE

## 2020-05-14 NOTE — NON-PROVIDER
Pt already an employee, has a mask fit and all vaccines. She needs a QFT.    DS, mask fit and vitals done by herah

## 2020-05-18 LAB
GAMMA INTERFERON BACKGROUND BLD IA-ACNC: 0.02 IU/ML
M TB IFN-G BLD-IMP: NEGATIVE
M TB IFN-G CD4+ BCKGRND COR BLD-ACNC: 0 IU/ML
MITOGEN IGNF BCKGRD COR BLD-ACNC: >10 IU/ML
QFT TB2 - NIL TBQ2: 0 IU/ML

## 2020-05-29 ENCOUNTER — EH NON-PROVIDER (OUTPATIENT)
Dept: OCCUPATIONAL MEDICINE | Facility: CLINIC | Age: 30
End: 2020-05-29

## 2020-05-29 DIAGNOSIS — Z71.85 IMMUNIZATION COUNSELING: ICD-10-CM

## 2020-11-09 ENCOUNTER — GYNECOLOGY VISIT (OUTPATIENT)
Dept: OBGYN | Facility: CLINIC | Age: 30
End: 2020-11-09
Payer: COMMERCIAL

## 2020-11-09 VITALS — BODY MASS INDEX: 33.3 KG/M2 | SYSTOLIC BLOOD PRESSURE: 107 MMHG | WEIGHT: 194 LBS | DIASTOLIC BLOOD PRESSURE: 68 MMHG

## 2020-11-09 DIAGNOSIS — Z30.46 NEXPLANON REMOVAL: ICD-10-CM

## 2020-11-09 PROCEDURE — 11982 REMOVE DRUG IMPLANT DEVICE: CPT | Performed by: OBSTETRICS & GYNECOLOGY

## 2020-11-09 RX ORDER — ONDANSETRON 4 MG/1
TABLET, FILM COATED ORAL
COMMUNITY
End: 2021-03-17

## 2020-11-09 RX ORDER — TRAMADOL HYDROCHLORIDE 50 MG/1
TABLET ORAL
COMMUNITY
End: 2021-03-17

## 2020-11-09 RX ORDER — HYDROCODONE BITARTRATE AND ACETAMINOPHEN 5; 325 MG/1; MG/1
TABLET ORAL
COMMUNITY
End: 2021-03-17

## 2020-11-09 NOTE — NON-PROVIDER
Pt here for new pt appt  Pt states that she would like her Nexplanon taken out.  Pharmacy verified  Good #:[phone]  PAP: 2 years ago [facility]  BC:Nexplanon

## 2020-11-09 NOTE — PROGRESS NOTES
Chief complaint: Nexplanon removal                                      Nexplanon Removal :  Patient given informed consent, and is aware that removal may take longer, require more anesthesia and time.  It also can make a scar slightly, but not substantially larger, than that used for insertion .   Patient is aware that we recommend alternative contraception, until she achieves a normal cycle after removal. Patient is aware that removal, requires identification of the entire implant, either by visual inspection or by xray imagery.     After prepped and draping of left  upper inner forearm, implant visually identified and site of insertion marked.   Lidocaine 1% with epi infiltrated both distal and deep to the prior insertion site for anesthesia.   Utilizing a scalpel # 15, a small vertical incision was made near scar of prior insertion site in the site of maximal implant visualization.   Implant identified and capsule was incised with scalpel.  Implant grasped with mosquito forceps and was easily removed, examined and found to be intact/complete.     Hemostasis at removal site achieved with pressure.  Incision closed with Steri Strips. The forearm was then wrapped with a pressure dressing.   Instructions for post operative care given .   Patient tolerated the procedure well.     Remove outer adhesive roll gauze after 24 hrs and steri strips after 3-4 days.     Patient not interested in any contraception at this time.  Aware that fertility returns very quickly after Nexplanon removal and that pregnancy may occur if no contraception is used.    All questions answered

## 2020-12-17 DIAGNOSIS — Z23 NEED FOR VACCINATION: ICD-10-CM

## 2021-03-10 ENCOUNTER — HOSPITAL ENCOUNTER (OUTPATIENT)
Facility: MEDICAL CENTER | Age: 31
End: 2021-03-10
Attending: NURSE PRACTITIONER
Payer: COMMERCIAL

## 2021-03-10 ENCOUNTER — OFFICE VISIT (OUTPATIENT)
Dept: URGENT CARE | Facility: CLINIC | Age: 31
End: 2021-03-10
Payer: COMMERCIAL

## 2021-03-10 VITALS
SYSTOLIC BLOOD PRESSURE: 118 MMHG | HEART RATE: 76 BPM | TEMPERATURE: 98.7 F | BODY MASS INDEX: 34.62 KG/M2 | WEIGHT: 202.8 LBS | DIASTOLIC BLOOD PRESSURE: 82 MMHG | OXYGEN SATURATION: 95 % | RESPIRATION RATE: 14 BRPM | HEIGHT: 64 IN

## 2021-03-10 DIAGNOSIS — J35.1 ENLARGED TONSILS: ICD-10-CM

## 2021-03-10 LAB
HETEROPH AB SER QL LA: NEGATIVE
INT CON NEG: NORMAL
INT CON NEG: NORMAL
INT CON POS: NORMAL
INT CON POS: NORMAL
S PYO AG THROAT QL: NEGATIVE

## 2021-03-10 PROCEDURE — 99203 OFFICE O/P NEW LOW 30 MIN: CPT | Performed by: NURSE PRACTITIONER

## 2021-03-10 PROCEDURE — 86308 HETEROPHILE ANTIBODY SCREEN: CPT | Performed by: NURSE PRACTITIONER

## 2021-03-10 PROCEDURE — 87070 CULTURE OTHR SPECIMN AEROBIC: CPT

## 2021-03-10 PROCEDURE — 87880 STREP A ASSAY W/OPTIC: CPT | Performed by: NURSE PRACTITIONER

## 2021-03-10 ASSESSMENT — ENCOUNTER SYMPTOMS
WEIGHT LOSS: 0
CHILLS: 0
SHORTNESS OF BREATH: 0
SORE THROAT: 1
SWOLLEN GLANDS: 1
FEVER: 0

## 2021-03-10 NOTE — PATIENT INSTRUCTIONS
Lymphadenopathy    Lymphadenopathy means that your lymph glands are swollen or larger than normal (enlarged). Lymph glands, also called lymph nodes, are collections of tissue that filter bacteria, viruses, and waste from your bloodstream. They are part of your body's disease-fighting system (immune system), which protects your body from germs.  There may be different causes of lymphadenopathy, depending on where it is in your body. Some types go away on their own. Lymphadenopathy can occur anywhere that you have lymph glands, including these areas:  · Neck (cervical lymphadenopathy).  · Chest (mediastinal lymphadenopathy).  · Lungs (hilar lymphadenopathy).  · Underarms (axillary lymphadenopathy).  · Groin (inguinal lymphadenopathy).  When your immune system responds to germs, infection-fighting cells and fluid build up in your lymph glands. This causes some swelling and enlargement. If the lymph glands do not go back to normal after you have an infection or disease, your health care provider may do tests. These tests help to monitor your condition and find the reason why the glands are still swollen and enlarged.  Follow these instructions at home:  · Get plenty of rest.  · Take over-the-counter and prescription medicines only as told by your health care provider. Your health care provider may recommend over-the-counter medicines for pain.  · If directed, apply heat to swollen lymph glands as often as told by your health care provider. Use the heat source that your health care provider recommends, such as a moist heat pack or a heating pad.  ? Place a towel between your skin and the heat source.  ? Leave the heat on for 20-30 minutes.  ? Remove the heat if your skin turns bright red. This is especially important if you are unable to feel pain, heat, or cold. You may have a greater risk of getting burned.  · Check your affected lymph glands every day for changes. Check other lymph gland areas as told by your health  care provider. Check for changes such as:  ? More swelling.  ? Sudden increase in size.  ? Redness or pain.  ? Hardness.  · Keep all follow-up visits as told by your health care provider. This is important.  Contact a health care provider if you have:  · Swelling that gets worse or spreads to other areas.  · Problems with breathing.  · Lymph glands that:  ? Are still swollen after 2 weeks.  ? Have suddenly gotten bigger.  ? Are red, painful, or hard.  · A fever or chills.  · Fatigue.  · A sore throat.  · Pain in your abdomen.  · Weight loss.  · Night sweats.  Get help right away if you have:  · Fluid leaking from an enlarged lymph gland.  · Severe pain.  · Chest pain.  · Shortness of breath.  Summary  · Lymphadenopathy means that your lymph glands are swollen or larger than normal (enlarged).  · Lymph glands (also called lymph nodes) are collections of tissue that filter bacteria, viruses, and waste from the bloodstream. They are part of your body's disease-fighting system (immune system).  · Lymphadenopathy can occur anywhere that you have lymph glands.  · If your enlarged and swollen lymph glands do not go back to normal after you have an infection or disease, your health care provider may do tests to monitor your condition and find the reason why the glands are still swollen and enlarged.  · Check your affected lymph glands every day for changes. Check other lymph gland areas as told by your health care provider.  This information is not intended to replace advice given to you by your health care provider. Make sure you discuss any questions you have with your health care provider.  Document Released: 09/26/2009 Document Revised: 11/30/2018 Document Reviewed: 11/02/2018  Elsevier Patient Education © 2020 [employer]    Tonsillitis    Tonsillitis is an infection of the throat that causes the tonsils to become red, tender, and swollen. Tonsils are tissues in the back of your throat. Each tonsil has crevices  (crypts). Tonsils normally work to protect the body from infection.  What are the causes?  Sudden (acute) tonsillitis may be caused by a virus or bacteria, including streptococcal bacteria. Long-lasting (chronic) tonsillitis occurs when the crypts of the tonsils become filled with pieces of food and bacteria, which makes it easy for the tonsils to become repeatedly infected.  Tonsillitis can be spread from person to person (is contagious). It may be spread by inhaling droplets that are released with coughing or sneezing. You may also come into contact with viruses or bacteria on surfaces, such as cups or utensils.  What are the signs or symptoms?  Symptoms of this condition include:  · A sore throat. This may include trouble swallowing.  · White patches on the tonsils.  · Swollen tonsils.  · Fever.  · Headache.  · Tiredness.  · Loss of appetite.  · Snoring during sleep when you did not snore before.  · Small, foul-smelling, yellowish-white pieces of material (tonsilloliths) that you occasionally cough up or spit out. These can cause you to have bad breath.  How is this diagnosed?  This condition is diagnosed with a physical exam. Diagnosis can be confirmed with the results of lab tests, including a throat culture.  How is this treated?  Treatment for this condition depends on the cause, but usually focuses on treating the symptoms associated with it. Treatment may include:  · Medicines to relieve pain and manage fever.  · Steroid medicines to reduce swelling.  · Antibiotic medicines if the condition is caused by bacteria.  If attacks of tonsillitis are severe and frequent, your health care provider may recommend surgery to remove the tonsils (tonsillectomy).  Follow these instructions at home:  Medicines  · Take over-the-counter and prescription medicines only as told by your health care provider.  · If you were prescribed an antibiotic medicine, take it as told by your health care provider. Do not stop taking the  antibiotic even if you start to feel better.  Eating and drinking  · Drink enough fluid to keep your urine clear or pale yellow.  · While your throat is sore, eat soft or liquid foods, such as sherbet, soups, or instant breakfast drinks.  · Drink warm liquids.  · Eat frozen ice pops.  General instructions  · Rest as much as possible and get plenty of sleep.  · Gargle with a salt-water mixture 3-4 times a day or as needed. To make a salt-water mixture, completely dissolve ½-1 tsp of salt in 1 cup of warm water.  · Wash your hands regularly with soap and water. If soap and water are not available, use hand .  · Do not share cups, bottles, or other utensils until your symptoms have gone away.  · Do not smoke. This can help your symptoms and prevent the infection from coming back. If you need help quitting, ask your health care provider.  · Keep all follow-up visits as told by your health care provider. This is important.  Contact a health care provider if:  · You notice large, tender lumps in your neck that were not there before.  · You have a fever that does not go away after 2-3 days.  · You develop a rash.  · You cough up a green, yellow-brown, or bloody substance.  · You cannot swallow liquids or food for 24 hours.  · Only one of your tonsils is swollen.  Get help right away if:  · You develop any new symptoms, such as vomiting, severe headache, stiff neck, chest pain, trouble breathing, or trouble swallowing.  · You have severe throat pain along with drooling or voice changes.  · You have severe pain that is not controlled with medicines.  · You cannot fully open your mouth.  · You develop redness, swelling, or severe pain anywhere in your neck.  Summary  · Tonsillitis is an infection of the throat that causes the tonsils to become red, tender, and swollen.  · Tonsillitis may be caused by a virus or bacteria.  · Rest as much as possible. Get plenty of sleep.  This information is not intended to replace  "advice given to you by your health care provider. Make sure you discuss any questions you have with your health care provider.  Document Released: 09/27/2006 Document Revised: 11/30/2018 Document Reviewed: 01/23/2018  Elsevier Patient Education © 2020 Else[employer] Inc[employer]    Stress  Stress-related medical problems are becoming increasingly common.  The body has a built-in physical response to stressful situations. Faced with pressure, challenge or danger, we need to react quickly. Our bodies release hormones such as cortisol and adrenaline to help do this. These hormones are part of the \"fight or flight\" response and affect the metabolic rate, heart rate and blood pressure, resulting in a heightened, stressed state that prepares the body for optimum performance in dealing with a stressful situation.  It is likely that early man required these mechanisms to stay alive, but usually modern stresses do not call for this, and the same hormones released in today's world can damage health and reduce coping ability.  CAUSES  · Pressure to perform at work, at school or in sports.  · Threats of physical violence.  · Money worries.  · Arguments.  · Family conflicts.  · Divorce or separation from significant other.  · Bereavement.  · New job or unemployment.  · Changes in location.  · Alcohol or drug abuse.  SOMETIMES, THERE IS NO PARTICULAR REASON FOR DEVELOPING STRESS.  Almost all people are at risk of being stressed at some time in their lives. It is important to know that some stress is temporary and some is long term.  · Temporary stress will go away when a situation is resolved. Most people can cope with short periods of stress, and it can often be relieved by relaxing, taking a walk, chatting through issues with friends, or having a good night's sleep.  · Chronic (long-term, continuous) stress is much harder to deal with. It can be psychologically and emotionally damaging. It can be harmful both for an individual and for " friends and family.  SYMPTOMS  Everyone reacts to stress differently. There are some common effects that help us recognize it. In times of extreme stress, people may:  · Shake uncontrollably.  · Breathe faster and deeper than normal (hyperventilate).  · Vomit.  · For people with asthma, stress can trigger an attack.  · For some people, stress may trigger migraine headaches, ulcers, and body pain.  PHYSICAL EFFECTS OF STRESS MAY INCLUDE:  · Loss of energy.  · Skin problems.  · Aches and pains resulting from tense muscles, including neck ache, backache and tension headaches.  · Increased pain from arthritis and other conditions.  · Irregular heart beat (palpitations).  · Periods of irritability or anger.  · Apathy or depression.  · Anxiety (feeling uptight or worrying).  · Unusual behavior.  · Loss of appetite.  · Comfort eating.  · Lack of concentration.  · Loss of, or decreased, sex-drive.  · Increased smoking, drinking, or recreational drug use.  · For women, missed periods.  · Ulcers, joint pain, and muscle pain.  Post-traumatic stress is the stress caused by any serious accident, strong emotional damage, or extremely difficult or violent experience such as rape or war.  Post-traumatic stress victims can experience mixtures of emotions such as fear, shame, depression, guilt or anger. It may include recurrent memories or images that may be haunting. These feelings can last for weeks, months or even years after the traumatic event that triggered them. Specialized treatment, possibly with medicines and psychological therapies, is available.  If stress is causing physical symptoms, severe distress or making it difficult for you to function as normal, it is worth seeing your caregiver. It is important to remember that although stress is a usual part of life, extreme or prolonged stress can lead to other illnesses that will need treatment. It is better to visit a doctor sooner rather than later. Stress has been linked  to the development of high blood pressure and heart disease, as well as insomnia and depression.  There is no diagnostic test for stress since everyone reacts to it differently. But a caregiver will be able to spot the physical symptoms, such as:  · Headaches.  · Shingles.  · Ulcers.  Emotional distress such as intense worry, low mood or irritability should be detected when the doctor asks pertinent questions to identify any underlying problems that might be the cause. In case there are physical reasons for the symptoms, the doctor may also want to do some tests to exclude certain conditions.  If you feel that you are suffering from stress, try to identify the aspects of your life that are causing it. Sometimes you may not be able to change or avoid them, but even a small change can have a positive ripple effect. A simple lifestyle change can make all the difference.  STRATEGIES THAT CAN HELP DEAL WITH STRESS:  · Delegating or sharing responsibilities.  · Avoiding confrontations.  · Learning to be more assertive.  · Regular exercise.  · Avoid using alcohol or street drugs to cope.  · Eating a healthy, balanced diet, rich in fruit and vegetables and proteins.  · Finding humor or absurdity in stressful situations.  · Never taking on more than you know you can handle comfortably.  · Organizing your time better to get as much done as possible.  · Talking to friends or family and sharing your thoughts and fears.  · Listening to music or relaxation tapes.  · Tensing and then relaxing your muscles, starting at the toes and working up to the head and neck.  If you think that you would benefit from help, either in identifying the things that are causing your stress or in learning techniques to help you relax, see a caregiver who is capable of helping you with this. Rather than relying on medications, it is usually better to try and identify the things in your life that are causing stress and try to deal with them.  There are  many techniques of managing stress including counseling, psychotherapy, aromatherapy, yoga, and exercise. Your caregiver can help you determine what is best for you.  Document Released: 03/09/2004 Document Revised: 03/11/2013 Document Reviewed: 02/03/2009  [employer]® Patient Information ©2014 [employer], [address].

## 2021-03-10 NOTE — PROGRESS NOTES
Subjective:     Agustín Brito is a 30 y.o. female who presents for Sore Throat ((L) tonsil enlarged x  6 month; if stressed then it is worse; difficult to talk and develope a horse voice )      Intermittent left tonsil swelling. Occurs more when stressed. States she was in a CODE, and it got so bad she couldn't talk. Stating the left tonsil was visibly enlarged. Able to feel it on the outside, and see enlargement inside. Others had noted the left tonsil was much larger than the right as well. Also will have pain with episode, sore throat and hoarse voice. No pain or symptoms currently. Last occurred last night. Duration varies, 20 minutes to half a day. Occurs 2-3 times a week. Feels fine now. No history of tonsillar stones. Hx of recurrent strep. No hx of mono. Hx of cold sores, no current lesions. Had COVID in November, symptoms had been occurring before. Saw dentist approximately 8 months ago, told imaging showed she had decreased sinus cavities and airway on the left side. No known sleep apnea. States she does snore. No hx of sleep study. No weight loss. Had had weight gain of 40 lbs over the last year, has been working nights for a year. Has had hx of GERD, no correlation to GERD with episodes of tonsillar swelling.      Pharyngitis   This is a recurrent problem. The current episode started more than 1 month ago. The problem has been waxing and waning. The pain is worse on the left side. There has been no fever. The pain is at a severity of 0/10. The patient is experiencing no pain. Associated symptoms include swollen glands. Pertinent negatives include no ear pain or shortness of breath. She has tried nothing for the symptoms.       No past medical history on file.    Past Surgical History:   Procedure Laterality Date   • PB KNEE SCOPE,AID ANT CRUCIATE REPAIR Left 7/9/2019    Procedure: RECONSTRUCTION, KNEE, ACL, ARTHROSCOPIC - WITH TIBIALIS ANTERIOR ALLOGRAFT;  Surgeon: Giovanni Rivera M.D.;   Location: SURGERY [facility];  Service: Orthopedics   • MEDIAL MENISCECTOMY Left 7/9/2019    Procedure: MENISCECTOMY, KNEE, LATERAL  -PARTIAL;  Surgeon: Giovanni Rivera M.D.;  Location: SURGERY [facility];  Service: Orthopedics   • TONSILLECTOMY  2009       Social History     Socioeconomic History   • Marital status: Single     Spouse name: Not on file   • Number of children: Not on file   • Years of education: Not on file   • Highest education level: Not on file   Occupational History   • Not on file   Tobacco Use   • Smoking status: Never Smoker   • Smokeless tobacco: Never Used   Substance and Sexual Activity   • Alcohol use: Yes     Alcohol/week: 1.2 oz     Types: 2 Glasses of wine per week     Comment: 4 drinks per week   • Drug use: No   • Sexual activity: Yes     Partners: Male     Birth control/protection: Implant   Other Topics Concern   • Not on file   Social History Narrative   • Not on file     Social Determinants of Health     Financial Resource Strain:    • Difficulty of Paying Living Expenses:    Food Insecurity:    • Worried About Running Out of Food in the Last Year:    • Ran Out of Food in the Last Year:    Transportation Needs:    • Lack of Transportation (Medical):    • Lack of Transportation (Non-Medical):    Physical Activity:    • Days of Exercise per Week:    • Minutes of Exercise per Session:    Stress:    • Feeling of Stress :    Social Connections:    • Frequency of Communication with Friends and Family:    • Frequency of Social Gatherings with Friends and Family:    • Attends [employer] Services:    • Active Member of Clubs or Organizations:    • Attends Club or Organization Meetings:    • Marital Status:    Intimate Partner Violence:    • Fear of Current or Ex-Partner:    • Emotionally Abused:    • Physically Abused:    • Sexually Abused:         Family History   Problem Relation Age of Onset   • Cancer Mother         breast    • No Known Problems Father    • No Known  "Problems Brother         Allergies   Allergen Reactions   • Penicillins Hives       Review of Systems   Constitutional: Positive for malaise/fatigue. Negative for chills, fever and weight loss.   HENT: Positive for sore throat. Negative for ear pain.    Respiratory: Negative for shortness of breath.    Endo/Heme/Allergies:        No correlation to allergies.    All other systems reviewed and are negative.       Objective:   /82 (BP Location: Right arm, Patient Position: Sitting)   Pulse 76   Temp 37.1 °C (98.7 °F) (Temporal)   Resp 14   Ht 1.626 m (5' 4\")   Wt 92 kg (202 lb 12.8 oz)   SpO2 95%   BMI 34.81 kg/m²     Physical Exam  Vitals reviewed.   Constitutional:       General: She is not in acute distress.     Appearance: She is well-developed.   HENT:      Head: Normocephalic and atraumatic.      Right Ear: Tympanic membrane, ear canal and external ear normal. No middle ear effusion. Tympanic membrane is not erythematous.      Left Ear: Tympanic membrane, ear canal and external ear normal.  No middle ear effusion. Tympanic membrane is not erythematous.      Nose: Nose normal.      Mouth/Throat:      Lips: Pink.      Mouth: Mucous membranes are moist. No oral lesions.      Dentition: No gum lesions.      Tongue: No lesions.      Pharynx: Oropharynx is clear. Uvula midline. No oropharyngeal exudate or posterior oropharyngeal erythema.      Tonsils: No tonsillar exudate or tonsillar abscesses. 2+ on the right. 2+ on the left.   Eyes:      Conjunctiva/sclera: Conjunctivae normal.   Cardiovascular:      Rate and Rhythm: Normal rate.   Pulmonary:      Effort: Pulmonary effort is normal. No respiratory distress.   Musculoskeletal:         General: Normal range of motion.      Cervical back: Normal range of motion and neck supple. No rigidity or tenderness.   Lymphadenopathy:      Head:      Right side of head: No submental, submandibular, tonsillar, preauricular, posterior auricular or occipital adenopathy. "      Left side of head: No submental, submandibular, tonsillar, preauricular, posterior auricular or occipital adenopathy.   Skin:     General: Skin is warm and dry.      Findings: No rash.   Neurological:      General: No focal deficit present.      Mental Status: She is alert and oriented to person, place, and time.      GCS: GCS eye subscore is 4. GCS verbal subscore is 5. GCS motor subscore is 6.   Psychiatric:         Mood and Affect: Mood normal.         Speech: Speech normal.         Behavior: Behavior normal.         Thought Content: Thought content normal.         Judgment: Judgment normal.         Assessment/Plan:   1. Enlarged tonsils  - POCT Rapid Strep A  - POCT Mononucleosis (mono)  - CBC WITH DIFFERENTIAL; Future  - REFERRAL TO FOLLOW-UP WITH PRIMARY CARE  - CULTURE THROAT; Future    Results for orders placed or performed in visit on 03/10/21   POCT Rapid Strep A   Result Value Ref Range    Rapid Strep Screen Negative     Internal Control Positive Valid     Internal Control Negative Valid    POCT Mononucleosis (mono)   Result Value Ref Range    Heterophile Screen Negative     Internal Control Positive Valid     Internal Control Negative Valid    -Tylenol and Motrin as directed for pain    Follow up for persistent throat pain, increased or persistent swelling, persistent fevers, difficulty swallowing, shortness of breath, weakness, elevated heart rate, or any other concerns.     Currently, does not present with symptoms. Has had symptoms intermittently x 6 months. With intermittent brief reporting of tonsillar enlargement: discussed possible correlation to allergy exposure vs perceived tightening of throat with stress or anxiety. Patient reports visualized enlargement of tonsil during episodes. Discussed r/o mono and bacterial causes, with follow up CBC. Establish and follow up with PCP.     Differential diagnosis, natural history, supportive care, and indications for immediate follow-up discussed.

## 2021-03-11 DIAGNOSIS — J35.1 ENLARGED TONSILS: ICD-10-CM

## 2021-03-13 LAB
BACTERIA SPEC RESP CULT: NORMAL
SIGNIFICANT IND 70042: NORMAL
SITE SITE: NORMAL
SOURCE SOURCE: NORMAL

## 2021-03-17 ENCOUNTER — TELEPHONE (OUTPATIENT)
Dept: URGENT CARE | Facility: CLINIC | Age: 31
End: 2021-03-17

## 2021-03-17 ENCOUNTER — OFFICE VISIT (OUTPATIENT)
Dept: MEDICAL GROUP | Facility: PHYSICIAN GROUP | Age: 31
End: 2021-03-17
Payer: COMMERCIAL

## 2021-03-17 VITALS
TEMPERATURE: 97.6 F | RESPIRATION RATE: 12 BRPM | SYSTOLIC BLOOD PRESSURE: 122 MMHG | HEIGHT: 64 IN | OXYGEN SATURATION: 95 % | HEART RATE: 83 BPM | BODY MASS INDEX: 34.59 KG/M2 | WEIGHT: 202.6 LBS | DIASTOLIC BLOOD PRESSURE: 80 MMHG

## 2021-03-17 DIAGNOSIS — Z13.0 SCREENING FOR BLOOD DISEASE: ICD-10-CM

## 2021-03-17 DIAGNOSIS — J34.9 SINUS PROBLEM: ICD-10-CM

## 2021-03-17 DIAGNOSIS — L40.9 PSORIASIS: ICD-10-CM

## 2021-03-17 DIAGNOSIS — Z23 NEED FOR VACCINATION: ICD-10-CM

## 2021-03-17 DIAGNOSIS — R53.83 FATIGUE, UNSPECIFIED TYPE: ICD-10-CM

## 2021-03-17 DIAGNOSIS — E66.9 OBESITY (BMI 30-39.9): ICD-10-CM

## 2021-03-17 DIAGNOSIS — J35.1 SWOLLEN TONSIL: ICD-10-CM

## 2021-03-17 DIAGNOSIS — Z86.16 HISTORY OF COVID-19: ICD-10-CM

## 2021-03-17 PROCEDURE — 99213 OFFICE O/P EST LOW 20 MIN: CPT | Mod: 25 | Performed by: NURSE PRACTITIONER

## 2021-03-17 PROCEDURE — 90686 IIV4 VACC NO PRSV 0.5 ML IM: CPT | Performed by: NURSE PRACTITIONER

## 2021-03-17 PROCEDURE — 90471 IMMUNIZATION ADMIN: CPT | Performed by: NURSE PRACTITIONER

## 2021-03-17 ASSESSMENT — ENCOUNTER SYMPTOMS
CARDIOVASCULAR NEGATIVE: 1
CHILLS: 0
FEVER: 0
MUSCULOSKELETAL NEGATIVE: 1
PSYCHIATRIC NEGATIVE: 1
RESPIRATORY NEGATIVE: 1
EYES NEGATIVE: 1
NEUROLOGICAL NEGATIVE: 1
GASTROINTESTINAL NEGATIVE: 1

## 2021-03-17 ASSESSMENT — PATIENT HEALTH QUESTIONNAIRE - PHQ9: CLINICAL INTERPRETATION OF PHQ2 SCORE: 0

## 2021-03-17 NOTE — PROGRESS NOTES
Chief Complaint   Patient presents with   • Establish Care     left tonsil gets inflammed and gives px, on and off      Subjective:     Agustín Brito is a 30 y.o. female presenting to establish care and management of:      Psoriasis  Chronic and stable condition  States she always has psoriasis somewhere all the time  Uses a mediated cream on body but does not remember what it is called  Has some currently on her scalp  Denies any other issues at this time    History of COVID-19  Chronic and stable condition.   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  Denies any other symptoms.    Swollen tonsil  Chronic and stable condition.   States for the past 6 months she has had 4-5 episodes of left tonsil swelling. It swells for about 24 hours then will disappear. She states it is painful when it occurs. Does not use anything for it.   She was recently seen in the  for it on 3/10/2021 however by the time she had the appointment the swelling had already gone down.   [place] and strep which all came back negative. She also had a culture completed which also did not show any infections. She states a extensive history of strep throat in the past. Denies chills, fevers or current swelling or pain.     Sinus problem  Undiagnosed new problem with uncertain prognosis.   Was seen at a new dentist where they did some type of imaging that showed she had a collapsed left maxillary sinus.  She also states that she is having continued issues with her tonsil on this same side.  Denies any difficulty with breathing or sinus pain at this time. Denies fevers, chills.         Review of Systems   Constitutional: Negative for chills and fever.   HENT: Positive for congestion.         Feels like its allergy season and is feeling congested   Eyes: Negative.    Respiratory: Negative.    Cardiovascular: Negative.    Gastrointestinal: Negative.   "  Genitourinary: Negative.    Musculoskeletal: Negative.    Skin: Positive for itching.        scalp due to psoriasis    Neurological: Negative.    Psychiatric/Behavioral: Negative.           No current outpatient medications on file.    Past Medical History:   Diagnosis Date   • Colitis    • History of strep sore throat    • Psoriasis        Past Surgical History:   Procedure Laterality Date   • PB KNEE SCOPE,AID ANT CRUCIATE REPAIR Left 7/9/2019    Procedure: RECONSTRUCTION, KNEE, ACL, ARTHROSCOPIC - WITH TIBIALIS ANTERIOR ALLOGRAFT;  Surgeon: Giovanni Rivera M.D.;  Location: SURGERY [facility];  Service: Orthopedics   • MEDIAL MENISCECTOMY Left 7/9/2019    Procedure: MENISCECTOMY, KNEE, LATERAL  -PARTIAL;  Surgeon: Giovanni Rivera M.D.;  Location: SURGERY [facility];  Service: Orthopedics       Family History   Problem Relation Age of Onset   • Cancer Mother         breast    • Hypertension Mother    • No Known Problems Father    • No Known Problems Brother    • Cancer Maternal Grandmother         breast cancer       Penicillins    Allergies, past medical history, past surgical history, family history, social history reviewed and updated    Objective:     Vitals: /80 (BP Location: Left arm, Patient Position: Sitting, BP Cuff Size: Adult)   Pulse 83   Temp 36.4 °C (97.6 °F) (Temporal)   Resp 12   Ht 1.626 m (5' 4\")   Wt 91.9 kg (202 lb 9.6 oz)   SpO2 95%   BMI 34.78 kg/m²   General: Alert, pleasant, NAD  EYES:   PERRL, EOMI, no icterus or pallor.  Conjunctivae and lids normal.   HENT:  Normocephalic.  External ears normal. Tympanic membranes pearly, opaque.  No nasal drainage present.  Oropharynx non-erythematous, mucous membranes moist.  Neck supple.   No thyromegaly or masses palpated. No cervical or supraclavicular lymphadenopathy.  Heart: Regular rate and rhythm.  S1 and S2 normal.  No murmurs appreciated.  Respiratory: Normal respiratory effort.  Clear to auscultation " bilaterally.  Abdomen: Non-distended, soft, non-tender, no guarding/rebound. Bowel sounds present.  No hepatosplenomegaly.  No hernias.  Skin: Warm, dry, no rashes.  Musculoskeletal: Gait is normal.  Moves all extremities well.    Extremities: No clubbing, cyanosis or edema noted.   Neurological: No tremors, sensation grossly intact, tone/strength normal, gait is normal, CN2-12 intact.  Psych:  Affect/mood is normal, judgement is good, memory is intact, grooming is appropriate.    Assessment/Plan:   1. Sinus problem  - CBC WITH DIFFERENTIAL; Future  We will get some lab work to see if there is anything related to infection on the blood work and then look at a possible referral to ENT at that point.     2. Swollen tonsil  - CBC WITH DIFFERENTIAL; Future  We will get some lab work to see if there is anything related to infection on the blood work and then look at a possible referral to ENT at that point.     3. Psoriasis  Continue with medicated cream    4. History of COVID-19    5. Obesity (BMI 30-39.9)  - Patient identified as having weight management issue.  Appropriate orders and counseling given.  - Comp Metabolic Panel; Future  - Lipid Profile; Future    6. Fatigue, unspecified type  - TSH WITH REFLEX TO FT4; Future    7. Screening for blood disease  - CBC WITH DIFFERENTIAL; Future    8. Need for vaccination  - Influenza Vaccine Quad Injection (PF)    Discussed with patient possible alternative diagnoses.    Reviewed risks and benefits of treatment plan. Patient verbalizes understanding of all instruction and verbally agrees to plan.      Return in about 2 weeks (around 3/31/2021) for follow labs and possible referral to ENT.    My total time spent caring for the patient on the day of the encounter was 29 minutes.   This does not include time spent on separately billable procedures/tests.     Please note that this dictation was created using voice recognition software. I have made every reasonable attempt to  correct obvious errors, but I expect that there are errors of grammar and possibly content that I did not discover before finalizing the note.

## 2021-03-17 NOTE — ASSESSMENT & PLAN NOTE
Chronic and stable condition  States she always has psoriasis somewhere all the time  Uses a mediated cream on body but does not remember what it is called  Has some currently on her scalp  Denies any other issues at this time

## 2021-03-17 NOTE — ASSESSMENT & PLAN NOTE
Undiagnosed new problem with uncertain prognosis.   Was seen at a new dentist where they did some type of imaging that showed she had a collapsed left maxillary sinus.  She also states that she is having continued issues with her tonsil on this same side.  Denies any difficulty with breathing or sinus pain at this time. Denies fevers, chills.

## 2021-03-17 NOTE — ASSESSMENT & PLAN NOTE
Chronic and stable condition.   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  Denies any other symptoms.

## 2021-03-17 NOTE — PATIENT INSTRUCTIONS
1. Get labs completed prior to our next visit.  These will be fasting labs, do not eat or drink 8 to 10 hours prior to your labs.  Make sure that you drink lots of water.  We will discuss the results of these at our next appointment. [phone]

## 2021-03-17 NOTE — ASSESSMENT & PLAN NOTE
Chronic and stable condition.   States for the past 6 months she has had 4-5 episodes of left tonsil swelling. It swells for about 24 hours then will disappear. She states it is painful when it occurs. Does not use anything for it.   She was recently seen in the  for it on 3/10/2021 however by the time she had the appointment the swelling had already gone down.   [place] and strep which all came back negative. She also had a culture completed which also did not show any infections. She states a extensive history of strep throat in the past. Denies chills, fevers or current swelling or pain.

## 2021-03-23 ENCOUNTER — HOSPITAL ENCOUNTER (OUTPATIENT)
Dept: LAB | Facility: MEDICAL CENTER | Age: 31
End: 2021-03-23
Attending: NURSE PRACTITIONER
Payer: COMMERCIAL

## 2021-03-23 DIAGNOSIS — J35.1 ENLARGED TONSILS: ICD-10-CM

## 2021-03-23 DIAGNOSIS — J35.1 SWOLLEN TONSIL: ICD-10-CM

## 2021-03-23 DIAGNOSIS — Z13.0 SCREENING FOR BLOOD DISEASE: ICD-10-CM

## 2021-03-23 DIAGNOSIS — E66.9 OBESITY (BMI 30-39.9): ICD-10-CM

## 2021-03-23 DIAGNOSIS — J34.9 SINUS PROBLEM: ICD-10-CM

## 2021-03-23 DIAGNOSIS — R53.83 FATIGUE, UNSPECIFIED TYPE: ICD-10-CM

## 2021-03-23 LAB
ALBUMIN SERPL BCP-MCNC: 4.3 G/DL (ref 3.2–4.9)
ALBUMIN/GLOB SERPL: 1.3 G/DL
ALP SERPL-CCNC: 62 U/L (ref 30–99)
ALT SERPL-CCNC: 19 U/L (ref 2–50)
ANION GAP SERPL CALC-SCNC: 9 MMOL/L (ref 7–16)
AST SERPL-CCNC: 21 U/L (ref 12–45)
BILIRUB SERPL-MCNC: 0.4 MG/DL (ref 0.1–1.5)
BUN SERPL-MCNC: 11 MG/DL (ref 8–22)
CALCIUM SERPL-MCNC: 9.8 MG/DL (ref 8.5–10.5)
CHLORIDE SERPL-SCNC: 104 MMOL/L (ref 96–112)
CHOLEST SERPL-MCNC: 178 MG/DL (ref 100–199)
CO2 SERPL-SCNC: 26 MMOL/L (ref 20–33)
CREAT SERPL-MCNC: 0.8 MG/DL (ref 0.5–1.4)
FASTING STATUS PATIENT QL REPORTED: NORMAL
GLOBULIN SER CALC-MCNC: 3.4 G/DL (ref 1.9–3.5)
GLUCOSE SERPL-MCNC: 75 MG/DL (ref 65–99)
HDLC SERPL-MCNC: 59 MG/DL
LDLC SERPL CALC-MCNC: 103 MG/DL
POTASSIUM SERPL-SCNC: 4.4 MMOL/L (ref 3.6–5.5)
PROT SERPL-MCNC: 7.7 G/DL (ref 6–8.2)
SODIUM SERPL-SCNC: 139 MMOL/L (ref 135–145)
TRIGL SERPL-MCNC: 81 MG/DL (ref 0–149)

## 2021-03-23 PROCEDURE — 84443 ASSAY THYROID STIM HORMONE: CPT

## 2021-03-23 PROCEDURE — 85025 COMPLETE CBC W/AUTO DIFF WBC: CPT

## 2021-03-23 PROCEDURE — 36415 COLL VENOUS BLD VENIPUNCTURE: CPT

## 2021-03-23 PROCEDURE — 80061 LIPID PANEL: CPT

## 2021-03-23 PROCEDURE — 80053 COMPREHEN METABOLIC PANEL: CPT

## 2021-03-24 LAB
BASOPHILS # BLD AUTO: 0.3 % (ref 0–1.8)
BASOPHILS # BLD: 0.03 K/UL (ref 0–0.12)
EOSINOPHIL # BLD AUTO: 0.11 K/UL (ref 0–0.51)
EOSINOPHIL NFR BLD: 1.3 % (ref 0–6.9)
ERYTHROCYTE [DISTWIDTH] IN BLOOD BY AUTOMATED COUNT: 40.6 FL (ref 35.9–50)
HCT VFR BLD AUTO: 45 % (ref 37–47)
HGB BLD-MCNC: 14.4 G/DL (ref 12–16)
IMM GRANULOCYTES # BLD AUTO: 0.02 K/UL (ref 0–0.11)
IMM GRANULOCYTES NFR BLD AUTO: 0.2 % (ref 0–0.9)
LYMPHOCYTES # BLD AUTO: 3.12 K/UL (ref 1–4.8)
LYMPHOCYTES NFR BLD: 35.5 % (ref 22–41)
MCH RBC QN AUTO: 29.1 PG (ref 27–33)
MCHC RBC AUTO-ENTMCNC: 32 G/DL (ref 33.6–35)
MCV RBC AUTO: 90.9 FL (ref 81.4–97.8)
MONOCYTES # BLD AUTO: 0.58 K/UL (ref 0–0.85)
MONOCYTES NFR BLD AUTO: 6.6 % (ref 0–13.4)
NEUTROPHILS # BLD AUTO: 4.94 K/UL (ref 2–7.15)
NEUTROPHILS NFR BLD: 56.1 % (ref 44–72)
NRBC # BLD AUTO: 0 K/UL
NRBC BLD-RTO: 0 /100 WBC
PLATELET # BLD AUTO: 303 K/UL (ref 164–446)
PMV BLD AUTO: 10.4 FL (ref 9–12.9)
RBC # BLD AUTO: 4.95 M/UL (ref 4.2–5.4)
TSH SERPL DL<=0.005 MIU/L-ACNC: 1.77 UIU/ML (ref 0.38–5.33)
WBC # BLD AUTO: 8.8 K/UL (ref 4.8–10.8)

## 2021-03-31 ENCOUNTER — OFFICE VISIT (OUTPATIENT)
Dept: MEDICAL GROUP | Facility: PHYSICIAN GROUP | Age: 31
End: 2021-03-31
Payer: COMMERCIAL

## 2021-03-31 VITALS
OXYGEN SATURATION: 95 % | DIASTOLIC BLOOD PRESSURE: 72 MMHG | SYSTOLIC BLOOD PRESSURE: 122 MMHG | HEIGHT: 64 IN | RESPIRATION RATE: 14 BRPM | HEART RATE: 70 BPM | TEMPERATURE: 97.8 F | WEIGHT: 203.2 LBS | BODY MASS INDEX: 34.69 KG/M2

## 2021-03-31 DIAGNOSIS — Z30.09 FAMILY PLANNING: ICD-10-CM

## 2021-03-31 DIAGNOSIS — J35.1 SWOLLEN TONSIL: ICD-10-CM

## 2021-03-31 DIAGNOSIS — N91.2 AMENORRHEA: ICD-10-CM

## 2021-03-31 LAB
INT CON NEG: NEGATIVE
INT CON POS: POSITIVE
POC URINE PREGNANCY TEST: NEGATIVE

## 2021-03-31 PROCEDURE — 99213 OFFICE O/P EST LOW 20 MIN: CPT | Performed by: NURSE PRACTITIONER

## 2021-03-31 PROCEDURE — 81025 URINE PREGNANCY TEST: CPT | Performed by: NURSE PRACTITIONER

## 2021-03-31 ASSESSMENT — ENCOUNTER SYMPTOMS
COUGH: 0
DIZZINESS: 0
CHILLS: 0
WEAKNESS: 0
PALPITATIONS: 0
FEVER: 0
HEADACHES: 0
SHORTNESS OF BREATH: 0
SINUS PAIN: 0
WEIGHT LOSS: 0
SORE THROAT: 0

## 2021-03-31 ASSESSMENT — FIBROSIS 4 INDEX: FIB4 SCORE: 0.46

## 2021-03-31 NOTE — PROGRESS NOTES
CC:  Chief Complaint   Patient presents with   • Lab Results       HISTORY OF PRESENT ILLNESS: Patient is a 30 y.o. female established patient presenting to review labs and follow up on neck. Patient will get imagine from dentist sent over for review and possible need for ENT.       Family planning  Patient stopped taking birth control in November.    Her and her  are working on family-planning.    They have been trying for the last 6 months.    She does have an OB GYN in [place]  She does state that her current cycle is late.    LMP- 2/9/2021  We will run a POC pregnancy.    Swollen tonsil  Chronic and stable condition.  Since her last visit she has had one episode.  CBC came back normal.  Denies any current swelling or pain at this time.  Denies fevers or chills.  Results for CAESAR MILLER (MRN [id number]) as of 3/31/2021 08:38   Ref. Range 3/23/2021 13:10   WBC Latest Ref Range: 4.8 - 10.8 K/uL 8.8       Amenorrhea  States that her current cycle is late.    LMP- 2/9/2021  We will run a POC pregnancy      Patient Active Problem List    Diagnosis Date Noted   • Family planning 03/31/2021   • Amenorrhea 03/31/2021   • Obesity (BMI 30-39.9) 03/17/2021   • Psoriasis    • History of COVID-19    • Swollen tonsil    • Sinus problem       Allergies:Penicillins    Current Outpatient Medications   Medication Sig Dispense Refill   • Prenatal MV-Min-Fe Fum-FA-DHA (PRENATAL 1 PO) Take  by mouth.       No current facility-administered medications for this visit.       Social History     Tobacco Use   • Smoking status: Never Smoker   • Smokeless tobacco: Never Used   Substance Use Topics   • Alcohol use: Yes     Alcohol/week: 1.2 oz     Types: 2 Glasses of wine per week     Comment: occ   • Drug use: No     Social History     Social History Narrative   • Not on file       Family History   Problem Relation Age of Onset   • Cancer Mother         breast    • Hypertension Mother    • No Known Problems Father    • No Known  "Problems Brother    • Cancer Maternal Grandmother         breast cancer        Review of Systems   Constitutional: Negative for chills, fever and weight loss.   HENT: Positive for ear pain. Negative for congestion, sinus pain and sore throat.         Right ear, tinnitus   Respiratory: Negative for cough and shortness of breath.    Cardiovascular: Negative for chest pain, palpitations and leg swelling.   Skin: Negative.    Neurological: Negative for dizziness, weakness and headaches.             - NOTE: All other systems reviewed and are negative, except as in HPI.      Exam:    /72 (BP Location: Left arm, Patient Position: Sitting, BP Cuff Size: Adult)   Pulse 70   Temp 36.6 °C (97.8 °F) (Temporal)   Resp 14   Ht 1.626 m (5' 4\")   Wt 92.2 kg (203 lb 3.2 oz)   SpO2 95%  Body mass index is 34.88 kg/m².    General: Alert, pleasant, NAD  EYES:   PERRL, EOMI, no icterus or pallor.  Conjunctivae and lids normal.   HENT:  Normocephalic.  External ears normal. Tympanic membranes pearly, opaque.  No nasal drainage present.  Oropharynx non-erythematous, mucous membranes moist.  Neck supple.   No thyromegaly or masses palpated. No cervical or supraclavicular lymphadenopathy.  Respiratory: Normal respiratory effort.    Abdomen: Non-distended, soft, non-tender, no guarding/rebound.   Skin: Warm, dry, no rashes.  Musculoskeletal: Gait is normal.  Moves all extremities well.    Extremities: No clubbing, cyanosis or edema noted.   Neurological: No tremors, sensation grossly intact, tone/strength normal, gait is normal, CN2-12 intact.  Psych:  Affect/mood is normal, judgement is good, memory is intact, grooming is appropriate.      LABS: 3/23/2021: Results reviewed and discussed with the patient, questions answered.    Results for CAESAR MILLER (MRN [id number]) as of 3/31/2021 09:38   Ref. Range 3/31/2021 08:25   POC Urine Pregnancy Test Latest Ref Range: Negative  negative     Assessment/Plan:  1. Swollen tonsil  - " US-SOFT TISSUES OF HEAD - NECK; Future    2. Family planning  - POCT Pregnancy    3. Amenorrhea  - POCT Pregnancy    Discussed with patient possible alternative diagnoses.     If symptoms persist FU w/PCP, if symptoms worsen go to emergency room.     Reviewed risks and benefits of treatment plan. Patient verbalizes understanding of all instruction and verbally agrees to plan.      Return in about 3 months (around 6/30/2021) for Pap.    My total time spent caring for the patient on the day of the encounter was 25 minutes.   This does not include time spent on separately billable procedures/tests.     Please note that this dictation was created using voice recognition software. I have made every reasonable attempt to correct obvious errors, but I expect that there are errors of grammar and possibly content that I did not discover before finalizing the note.

## 2021-03-31 NOTE — ASSESSMENT & PLAN NOTE
Patient stopped taking birth control in November.    Her and her  are working on family-planning.    They have been trying for the last 6 months.    She does have an OB GYN in [place]  She does state that her current cycle is late.    LMP- 2/9/2021  We will run a POC pregnancy.

## 2021-03-31 NOTE — ASSESSMENT & PLAN NOTE
Chronic and stable condition.  Since her last visit she has had one episode.  CBC came back normal.  Denies any current swelling or pain at this time.  Denies fevers or chills.  Results for CAESAR MILLER (MRN [id number]) as of 3/31/2021 08:38   Ref. Range 3/23/2021 13:10   WBC Latest Ref Range: 4.8 - 10.8 K/uL 8.8

## 2021-04-22 ENCOUNTER — EH NON-PROVIDER (OUTPATIENT)
Dept: OCCUPATIONAL MEDICINE | Facility: CLINIC | Age: 31
End: 2021-04-22
Payer: COMMERCIAL

## 2021-04-22 DIAGNOSIS — Z02.89 ENCOUNTER FOR OCCUPATIONAL HEALTH EXAMINATION INVOLVING RESPIRATOR: ICD-10-CM

## 2021-04-22 PROCEDURE — 94375 RESPIRATORY FLOW VOLUME LOOP: CPT | Performed by: PREVENTIVE MEDICINE

## 2021-11-12 ENCOUNTER — GYNECOLOGY VISIT (OUTPATIENT)
Dept: OBGYN | Facility: CLINIC | Age: 31
End: 2021-11-12
Payer: COMMERCIAL

## 2021-11-12 ENCOUNTER — HOSPITAL ENCOUNTER (OUTPATIENT)
Facility: MEDICAL CENTER | Age: 31
End: 2021-11-12
Attending: OBSTETRICS & GYNECOLOGY
Payer: COMMERCIAL

## 2021-11-12 VITALS — BODY MASS INDEX: 35.87 KG/M2 | SYSTOLIC BLOOD PRESSURE: 132 MMHG | DIASTOLIC BLOOD PRESSURE: 76 MMHG | WEIGHT: 209 LBS

## 2021-11-12 DIAGNOSIS — Z01.419 WELL WOMAN EXAM WITH ROUTINE GYNECOLOGICAL EXAM: ICD-10-CM

## 2021-11-12 DIAGNOSIS — N97.0 INFERTILITY ASSOCIATED WITH ANOVULATION: ICD-10-CM

## 2021-11-12 PROCEDURE — 87491 CHLMYD TRACH DNA AMP PROBE: CPT

## 2021-11-12 PROCEDURE — 88175 CYTOPATH C/V AUTO FLUID REDO: CPT

## 2021-11-12 PROCEDURE — 99395 PREV VISIT EST AGE 18-39: CPT | Performed by: OBSTETRICS & GYNECOLOGY

## 2021-11-12 PROCEDURE — 87624 HPV HI-RISK TYP POOLED RSLT: CPT

## 2021-11-12 PROCEDURE — 87591 N.GONORRHOEAE DNA AMP PROB: CPT

## 2021-11-12 ASSESSMENT — FIBROSIS 4 INDEX: FIB4 SCORE: 0.48

## 2021-11-12 NOTE — PROGRESS NOTES
Agustín Brito is a 31 y.o.  female who presents for her Annual Gynecologic Exam      HPI Comments: Pt presents for her annual well woman exam.   Additionally, patient states that she has been trying to achieve pregnancy for the past 11 months without success.  Her menses was never exactly regular coming approximately every 28 to 38 days and lasting approximately 4 to 5 days with normal bleeding.  She was on oral birth control pills for a while and then started Nexplanon in two thousand eighteen which was removed in two thousand twenty.  Since removal, she had approximately 4 months with regular menses every 28 days however ovulation predictor tests were only positive twice and she did not become pregnant.  That her periods became irregular coming every 30 to 90 days.  When she does have a menses, they are normal for her lasting 4 to 5 days with normal bleeding.  Her partner is male and 39 years old.  He is healthy but has never fathered any children by another partner.    Review of Systems:   Pertinent positives documented in HPI and all other systems reviewed & are negative    All PMH, PSH, allergies, social history and FH reviewed and updated today:    PGYN Hx:  LMP: 11/10/2021  Cycles: Previously every 28 to 38 days, rarely more than 1 week late however more recently every 60 to 90 days.  Bleeds for 4 to 5 days consistently.  Sexually active with male partner, 39 years old  Birth control history: HARI's, Nexplanon.  STD hx: Denies  Pap smear Hx: 2018, unsure, denies history of cervical biopsies or excisional procedures.    OB History    Para Term  AB Living   0 0 0 0 0 0   SAB IAB Ectopic Molar Multiple Live Births   0 0 0 0 0 0        Past Medical History:   Diagnosis Date   • Colitis    • History of strep sore throat    • Psoriasis        Past Surgical History:   Procedure Laterality Date   • PB KNEE SCOPE,AID ANT CRUCIATE REPAIR Left 2019    Procedure: RECONSTRUCTION, KNEE, ACL,  ARTHROSCOPIC - WITH TIBIALIS ANTERIOR ALLOGRAFT;  Surgeon: Giovanni Rivera M.D.;  Location: SURGERY [facility];  Service: Orthopedics   • MEDIAL MENISCECTOMY Left 7/9/2019    Procedure: MENISCECTOMY, KNEE, LATERAL  -PARTIAL;  Surgeon: Giovanni Rivera M.D.;  Location: SURGERY [facility];  Service: Orthopedics       Medications:   Current Outpatient Medications Ordered in Epic   Medication Sig Dispense Refill   • Prenatal MV-Min-Fe Fum-FA-DHA (PRENATAL 1 PO) Take  by mouth. (Patient not taking: Reported on 11/12/2021)       No current [facility]-ordered facility-administered medications on file.       Penicillins    Social History     Socioeconomic History   • Marital status: Single     Spouse name: Not on file   • Number of children: Not on file   • Years of education: Not on file   • Highest education level: Not on file   Occupational History   • Not on file   Tobacco Use   • Smoking status: Never Smoker   • Smokeless tobacco: Never Used   Vaping Use   • Vaping Use: Never used   Substance and Sexual Activity   • Alcohol use: Yes     Alcohol/week: 1.2 oz     Types: 2 Glasses of wine per week     Comment: occ   • Drug use: No   • Sexual activity: Yes     Partners: Male     Birth control/protection: None   Other Topics Concern   • Not on file   Social History Narrative   • Not on file     Social Determinants of Health     Financial Resource Strain:    • Difficulty of Paying Living Expenses: Not on file   Food Insecurity:    • Worried About Running Out of Food in the Last Year: Not on file   • Ran Out of Food in the Last Year: Not on file   Transportation Needs:    • Lack of Transportation (Medical): Not on file   • Lack of Transportation (Non-Medical): Not on file   Physical Activity:    • Days of Exercise per Week: Not on file   • Minutes of Exercise per Session: Not on file   Stress:    • Feeling of Stress : Not on file   Social Connections:    • Frequency of Communication with Friends and Family:  Not on file   • Frequency of Social Gatherings with Friends and Family: Not on file   • Attends [employer] Services: Not on file   • Active Member of Clubs or Organizations: Not on file   • Attends Club or Organization Meetings: Not on file   • Marital Status: Not on file   Intimate Partner Violence:    • Fear of Current or Ex-Partner: Not on file   • Emotionally Abused: Not on file   • Physically Abused: Not on file   • Sexually Abused: Not on file   Housing Stability:    • Unable to Pay for Housing in the Last Year: Not on file   • Number of Places Lived in the Last Year: Not on file   • Unstable Housing in the Last Year: Not on file       Family History   Problem Relation Age of Onset   • Cancer Mother         breast    • Hypertension Mother    • No Known Problems Father    • No Known Problems Brother    • Cancer Maternal Grandmother         breast cancer        Objective:   Vital measurements:  /76 (BP Location: Right arm, Patient Position: Sitting)   Wt 94.8 kg (209 lb)   Body mass index is 35.87 kg/m². (Goal BM I>18 <25)    Physical Exam   Nursing note and vitals reviewed.  Constitutional: She is oriented to person, place, and time. She appears well-developed and well-nourished. No distress.     HEENT:   Head: Normocephalic and atraumatic.   Right Ear: External ear normal.   Left Ear: External ear normal.   Nose: Nose normal.   Eyes: Conjunctivae and EOM are normal. Pupils are equal, round, and reactive to light. No scleral icterus.     Neck: Normal range of motion. Neck supple. No tracheal deviation present. No thyromegaly present. No cervical or supraclavicular lymphadenopathy.    Pulmonary/Chest: Effort normal and breath sounds normal. No respiratory distress. She has no wheezes. She has no rales. She exhibits no tenderness.     Breast: Symmetrical, normal consistency without masses., No dimpling or skin changes    Abdominal: Soft. Bowel sounds are normal. She exhibits no distension and no mass. No  tenderness. She has no rebound and no guarding.     Genitourinary:  Pelvic exam was performed with patient supine.  External genitalia with no abnormal pigmentation, labial fusion, rashes, tenderness, lesions or injury to the labia bilaterally.  BUS normal  Vagina is pink and moist with no lesions, foul discharge, erythema, tenderness or bleeding. No foreign body around the vagina or signs of injury.   Cervix exhibits no motion tenderness, no discharge and no friability, no lesions.   Uterus is small, mid position not deviated, not enlarged, not fixed and not tender.  Right adnexa displays no mass, no tenderness and no fullness.  Left adnexa displays no mass, no tenderness and no fullness.     Musculoskeletal: Normal range of motion. Non tender. She exhibits no edema and no tenderness.     Lymphadenopathy: She has no cervical or supraclavicular adenopathy.     Neurological: She is alert and oriented to person, place, and time. She exhibits normal muscle tone.     Skin: Skin is warm and dry. No rash noted. She is not diaphoretic. No erythema. No pallor.     Psychiatric: She has a normal mood and affect. Her behavior is normal. Judgment and thought content normal.     Pelvic and breast exams chaperoned by MA.     Assessment:     1. Well woman exam with routine gynecological exam  THINPREP PAP W/HPV AND CTNG   2. Infertility associated with anovulation  US-PELVIC TRANSVAGINAL ONLY    PROLACTIN    PROGESTERONE    FSH    ANTI-MULLERIAN HORMONE([facility])    TESTOSTERONE F&T FEMALES/CHILD    TSH    HEMOGLOBIN A1C    Referral to Infertility       Plan:     #Well Woman  - Pap and physical exam performed; discussed pap smear screening guidelines.  Also performed gonorrhea chlamydia testing with a Pap smear.  - Self breast awareness discussed.  - Encouraged exercise and proper diet.  - Mammograms starting @ age 40 annually.  - See medications and orders placed in encounter report.  - Follow up in 1 year for annual exam or sooner as  needed    #Infertility  -Discussed that she now meets the criteria for primary infertility.  She was referred to [facility] for reproductive medicine.  Transvaginal ultrasound for ovarian volume to rule out PCOS  Day twenty-one progesterone for ovulation  Hemoglobin A1c, TSH.  If abnormal, will refer to PCP  [facility], testosterone, and prolactin also ordered.  Advised to get prolactin done in the morning while fasting and to avoid nipple stimulation.

## 2021-11-12 NOTE — NON-PROVIDER
Pt here today for GYN appt.  Wants to discuss Conceiving.  Phone # [phone] (home)   Pharmacy verified.

## 2021-11-15 DIAGNOSIS — Z01.419 WELL WOMAN EXAM WITH ROUTINE GYNECOLOGICAL EXAM: ICD-10-CM

## 2021-11-16 LAB
C TRACH DNA GENITAL QL NAA+PROBE: NEGATIVE
CYTOLOGY REG CYTOL: NORMAL
HPV HR 12 DNA CVX QL NAA+PROBE: NEGATIVE
HPV16 DNA SPEC QL NAA+PROBE: NEGATIVE
HPV18 DNA SPEC QL NAA+PROBE: NEGATIVE
N GONORRHOEA DNA GENITAL QL NAA+PROBE: NEGATIVE
SPECIMEN SOURCE: NORMAL
SPECIMEN SOURCE: NORMAL

## 2021-11-17 ENCOUNTER — OFFICE VISIT (OUTPATIENT)
Dept: URGENT CARE | Facility: CLINIC | Age: 31
End: 2021-11-17
Payer: COMMERCIAL

## 2021-11-17 ENCOUNTER — HOSPITAL ENCOUNTER (OUTPATIENT)
Facility: MEDICAL CENTER | Age: 31
End: 2021-11-17
Attending: PHYSICIAN ASSISTANT
Payer: COMMERCIAL

## 2021-11-17 VITALS
HEIGHT: 64 IN | DIASTOLIC BLOOD PRESSURE: 72 MMHG | RESPIRATION RATE: 16 BRPM | TEMPERATURE: 98.7 F | SYSTOLIC BLOOD PRESSURE: 110 MMHG | HEART RATE: 92 BPM | OXYGEN SATURATION: 97 % | BODY MASS INDEX: 35.42 KG/M2 | WEIGHT: 207.5 LBS

## 2021-11-17 DIAGNOSIS — J06.9 UPPER RESPIRATORY TRACT INFECTION, UNSPECIFIED TYPE: ICD-10-CM

## 2021-11-17 DIAGNOSIS — J02.9 SORE THROAT: ICD-10-CM

## 2021-11-17 DIAGNOSIS — Z20.828 RSV EXPOSURE: ICD-10-CM

## 2021-11-17 LAB
INT CON NEG: NORMAL
INT CON POS: NORMAL
RSV AG SPEC QL IA: NORMAL
S PYO AG THROAT QL: NEGATIVE
SIGNIFICANT IND 70042: NORMAL
SITE SITE: NORMAL
SOURCE SOURCE: NORMAL

## 2021-11-17 PROCEDURE — 99214 OFFICE O/P EST MOD 30 MIN: CPT | Mod: CS | Performed by: PHYSICIAN ASSISTANT

## 2021-11-17 PROCEDURE — 87880 STREP A ASSAY W/OPTIC: CPT | Performed by: PHYSICIAN ASSISTANT

## 2021-11-17 PROCEDURE — 0240U HCHG SARS-COV-2 COVID-19 NFCT DS RESP RNA 3 TRGT MIC: CPT

## 2021-11-17 PROCEDURE — 87420 RESP SYNCYTIAL VIRUS AG IA: CPT

## 2021-11-17 RX ORDER — DEXTROMETHORPHAN HYDROBROMIDE AND PROMETHAZINE HYDROCHLORIDE 15; 6.25 MG/5ML; MG/5ML
5 SYRUP ORAL 4 TIMES DAILY PRN
Qty: 100 ML | Refills: 0 | Status: SHIPPED | OUTPATIENT
Start: 2021-11-17 | End: 2021-11-22

## 2021-11-17 RX ORDER — FLUTICASONE PROPIONATE 50 MCG
1 SPRAY, SUSPENSION (ML) NASAL DAILY
Qty: 16 G | Refills: 0 | Status: SHIPPED | OUTPATIENT
Start: 2021-11-17

## 2021-11-17 ASSESSMENT — ENCOUNTER SYMPTOMS
FEVER: 0
EYE REDNESS: 0
DIZZINESS: 0
NECK PAIN: 0
EYE DISCHARGE: 0
SINUS PAIN: 1
MYALGIAS: 1
HEADACHES: 1
SPUTUM PRODUCTION: 1
WHEEZING: 0
VOMITING: 0
SHORTNESS OF BREATH: 0
COUGH: 1
CHILLS: 0
SORE THROAT: 1
DIARRHEA: 0

## 2021-11-17 ASSESSMENT — FIBROSIS 4 INDEX: FIB4 SCORE: 0.48

## 2021-11-17 NOTE — PROGRESS NOTES
"Jameson Brito is a 31 y.o. female who presents with Coronavirus Screening (sore throat, jillian ear pain, congestion, runny nose, congestion, pain behind eyes x 10 day; exposure to RSV via work )            URI   This is a new problem. The current episode started 1 to 4 weeks ago. The problem has been unchanged. There has been no fever. Associated symptoms include congestion, coughing, headaches, sinus pain and a sore throat. Pertinent negatives include no diarrhea, ear pain, neck pain, rash, vomiting or wheezing.     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  She is vaccinated to COVID-19 with [employer]-denies any ill exposures to COVID-19 that she is aware of.  She has been taking over-the-counter DayQuil with mild improvement of symptoms.    Review of Systems   Constitutional: Positive for malaise/fatigue. Negative for chills and fever.   HENT: Positive for congestion, sinus pain and sore throat. Negative for ear discharge and ear pain.    Eyes: Negative for discharge and redness.   Respiratory: Positive for cough and sputum production. Negative for shortness of breath and wheezing.    Gastrointestinal: Negative for diarrhea and vomiting.   Musculoskeletal: Positive for myalgias. Negative for neck pain.   Skin: Negative for itching and rash.   Neurological: Positive for headaches. Negative for dizziness.              Objective     /72 (BP Location: Right arm, Patient Position: Sitting)   Pulse 92   Temp 37.1 °C (98.7 °F) (Temporal)   Resp 16   Ht 1.626 m (5' 4\")   Wt 94.1 kg (207 lb 8 oz)   SpO2 97%   BMI 35.62 kg/m²    PMH:  has a past medical history of Colitis, History of strep sore throat, and Psoriasis. She also has no past medical history of Addisons disease (HCC), Adrenal disorder (HCC), Allergy, Anemia, Anxiety, Arrhythmia, Arthritis, Asthma, Blood transfusion without reported diagnosis, Cancer (HCC), Cataract, CHF " (congestive heart failure) (HCC), Clotting disorder (HCC), COPD (chronic obstructive pulmonary disease) (HCC), Cushings syndrome (HCC), Depression, Diabetes (HCC), Diabetic neuropathy (HCC), GERD (gastroesophageal reflux disease), Glaucoma, Goiter, Head ache, Heart attack (HCC), Heart murmur, HIV (human immunodeficiency virus infection) (Former[facility]), Hyperlipidemia, Hypertension, Kidney disease, Meningitis, Migraine, Muscle disorder, Osteoporosis, Parathyroid disorder (HCC), Pituitary disease (HCC), Pulmonary emphysema (HCC), Seizure (HCC), Sickle cell disease (HCC), Stroke (HCC), Substance abuse ([facility]), Thyroid disease, Tuberculosis, or Urinary tract infection.  MEDS: Reviewed .   ALLERGIES:   Allergies   Allergen Reactions   • Penicillins Hives and Rash     SURGHX:   Past Surgical History:   Procedure Laterality Date   • PB KNEE SCOPE,AID ANT CRUCIATE REPAIR Left 7/9/2019    Procedure: RECONSTRUCTION, KNEE, ACL, ARTHROSCOPIC - WITH TIBIALIS ANTERIOR ALLOGRAFT;  Surgeon: Giovanni Rivera M.D.;  Location: SURGERY [facility];  Service: Orthopedics   • MEDIAL MENISCECTOMY Left 7/9/2019    Procedure: MENISCECTOMY, KNEE, LATERAL  -PARTIAL;  Surgeon: Giovanni Rivera M.D.;  Location: SURGERY [facility];  Service: Orthopedics     SOCHX:  reports that she has never smoked. She has never used smokeless tobacco. She reports current alcohol use of about 1.2 oz of alcohol per week. She reports that she does not use drugs.  FH: Family history was reviewed, no pertinent findings to report    Physical Exam  Vitals reviewed.   Constitutional:       Appearance: Normal appearance. She is well-developed.   HENT:      Head: Normocephalic and atraumatic.      Ears:      Comments: Bilateral clear middle ear effusions.     Nose:      Comments: Rhinorrhea noted.     Mouth/Throat:      Comments: Posterior oropharynx is erythematous, positive postnasal drainage.  No evidence of exudate.  Eyes:      Conjunctiva/sclera:  Conjunctivae normal.      Pupils: Pupils are equal, round, and reactive to light.   Cardiovascular:      Rate and Rhythm: Normal rate and regular rhythm.      Heart sounds: No murmur heard.      Pulmonary:      Effort: Pulmonary effort is normal. No respiratory distress.      Breath sounds: Normal breath sounds.   Musculoskeletal:         General: Normal range of motion.      Cervical back: Normal range of motion and neck supple.      Right lower leg: No edema.      Left lower leg: No edema.   Lymphadenopathy:      Cervical: No cervical adenopathy.   Skin:     General: Skin is warm.      Findings: No rash.   Neurological:      Mental Status: She is alert and oriented to person, place, and time.   Psychiatric:         Mood and Affect: Mood normal.         Behavior: Behavior normal.         Thought Content: Thought content normal.                             Assessment & Plan        1. Upper respiratory tract infection, unspecified type  - CoV-2, Flu A/B, And RSV by PCR; Future  - promethazine-dextromethorphan (PROMETHAZINE-DM) 6.25-15 MG/5ML syrup; Take 5 mL by mouth 4 times a day as needed for Cough for up to 5 days.  Dispense: 100 mL; Refill: 0  - fluticasone (FLONASE ALLERGY RELIEF) 50 MCG/ACT nasal spray; Administer 1 Spray into affected nostril(S) every day.  Dispense: 16 g; Refill: 0    2. RSV exposure  - CoV-2, Flu A/B, And RSV by PCR; Future    3. Sore throat  - POCT Rapid Strep A  - CoV-2, Flu A/B, And RSV by PCR; Future            Strep in clinic-negative.  Appropriate PPE worn at all times by provider.   Pt. Had face mask on throughout entirety of the visit other than oropharyngeal examination today.     Testing performed for COVID-19.    Patient currently without indication of need for higher level of care.    Reviewed with patient/guardian that if they do test positive they will be contacted by their local health department regarding return to work/school protocols.  Results will also be released to  patient/guardian in MyChart or called to the patient/guardian directly. Discussed isolation/quarantine recommendations.  Encouraged mask use, frequent handwashing, wiping down hard surfaces, etc.    Patient and/or guardian given precautionary s/sx that mandate immediate follow up and evaluation in the ED. Advised of risks of not doing so.  Side effects of OTC or prescribed medications discussed.   DDX, Supportive care, and indications for immediate follow-up discussed.  Instructed to return to clinic or nearest emergency department if we are not available for any change in condition, further concerns, or worsening of symptoms.    The patient and/or guardian demonstrated a good understanding and agreed with the treatment plan.    Please note that this dictation was created using voice recognition software. I have made every reasonable attempt to correct obvious errors, but I expect that there are errors of grammar and possibly content that I did not discover before finalizing the note.

## 2021-11-18 LAB
FLUAV RNA SPEC QL NAA+PROBE: NEGATIVE
FLUBV RNA SPEC QL NAA+PROBE: NEGATIVE
SARS-COV-2 RNA RESP QL NAA+PROBE: NOTDETECTED
SPECIMEN SOURCE: NORMAL

## 2021-11-22 ENCOUNTER — HOSPITAL ENCOUNTER (OUTPATIENT)
Dept: LAB | Facility: MEDICAL CENTER | Age: 31
End: 2021-11-22
Attending: OBSTETRICS & GYNECOLOGY
Payer: COMMERCIAL

## 2021-11-22 DIAGNOSIS — N97.0 INFERTILITY ASSOCIATED WITH ANOVULATION: ICD-10-CM

## 2021-11-22 LAB
EST. AVERAGE GLUCOSE BLD GHB EST-MCNC: 114 MG/DL
FSH SERPL-ACNC: 6.5 MIU/ML
HBA1C MFR BLD: 5.6 % (ref 4–5.6)
PROGEST SERPL-MCNC: 0.06 NG/ML
PROLACTIN SERPL-MCNC: 15 NG/ML (ref 2.8–26)
TSH SERPL DL<=0.005 MIU/L-ACNC: 3.7 UIU/ML (ref 0.38–5.33)

## 2021-11-22 PROCEDURE — 84144 ASSAY OF PROGESTERONE: CPT

## 2021-11-22 PROCEDURE — 83520 IMMUNOASSAY QUANT NOS NONAB: CPT

## 2021-11-22 PROCEDURE — 84146 ASSAY OF PROLACTIN: CPT

## 2021-11-22 PROCEDURE — 84402 ASSAY OF FREE TESTOSTERONE: CPT

## 2021-11-22 PROCEDURE — 36415 COLL VENOUS BLD VENIPUNCTURE: CPT

## 2021-11-22 PROCEDURE — 84270 ASSAY OF SEX HORMONE GLOBUL: CPT

## 2021-11-22 PROCEDURE — 84443 ASSAY THYROID STIM HORMONE: CPT

## 2021-11-22 PROCEDURE — 84403 ASSAY OF TOTAL TESTOSTERONE: CPT

## 2021-11-22 PROCEDURE — 83001 ASSAY OF GONADOTROPIN (FSH): CPT

## 2021-11-22 PROCEDURE — 83036 HEMOGLOBIN GLYCOSYLATED A1C: CPT

## 2021-11-26 LAB — MIS SERPL-MCNC: 36.63 NG/ML (ref 0.18–11.71)

## 2021-11-28 LAB
SHBG SERPL-SCNC: 27 NMOL/L (ref 30–135)
TESTOST FREE SERPL-MCNC: 7.9 PG/ML (ref 0.8–7.4)
TESTOST SERPL-MCNC: 43 NG/DL (ref 9–55)

## 2022-01-19 ENCOUNTER — HOSPITAL ENCOUNTER (OUTPATIENT)
Facility: MEDICAL CENTER | Age: 32
End: 2022-01-19
Payer: COMMERCIAL

## 2022-01-19 LAB
AMBIGUOUS DTTM AMBI4: NORMAL
COVID ORDER STATUS COVID19: NORMAL

## 2022-01-20 ENCOUNTER — OFFICE VISIT (OUTPATIENT)
Dept: URGENT CARE | Facility: CLINIC | Age: 32
End: 2022-01-20
Payer: COMMERCIAL

## 2022-01-20 VITALS
OXYGEN SATURATION: 96 % | HEIGHT: 64 IN | TEMPERATURE: 100.1 F | BODY MASS INDEX: 35.51 KG/M2 | WEIGHT: 208 LBS | DIASTOLIC BLOOD PRESSURE: 74 MMHG | SYSTOLIC BLOOD PRESSURE: 110 MMHG | RESPIRATION RATE: 16 BRPM | HEART RATE: 95 BPM

## 2022-01-20 DIAGNOSIS — H92.02 LEFT EAR PAIN: ICD-10-CM

## 2022-01-20 DIAGNOSIS — J02.0 PHARYNGITIS DUE TO STREPTOCOCCUS SPECIES: ICD-10-CM

## 2022-01-20 LAB
INT CON NEG: NEGATIVE
INT CON POS: POSITIVE
S PYO AG THROAT QL: POSITIVE
SARS-COV-2 RNA RESP QL NAA+PROBE: NOTDETECTED
SPECIMEN SOURCE: NORMAL

## 2022-01-20 PROCEDURE — 87880 STREP A ASSAY W/OPTIC: CPT | Performed by: PHYSICIAN ASSISTANT

## 2022-01-20 PROCEDURE — 99213 OFFICE O/P EST LOW 20 MIN: CPT | Performed by: PHYSICIAN ASSISTANT

## 2022-01-20 RX ORDER — CEFDINIR 300 MG/1
300 CAPSULE ORAL 2 TIMES DAILY
Qty: 20 CAPSULE | Refills: 0 | Status: SHIPPED | OUTPATIENT
Start: 2022-01-20 | End: 2022-01-30

## 2022-01-20 ASSESSMENT — ENCOUNTER SYMPTOMS
NAUSEA: 0
MYALGIAS: 0
VOMITING: 0
HEADACHES: 1
DIAPHORESIS: 0
SORE THROAT: 1
PALPITATIONS: 0
ABDOMINAL PAIN: 0
FEVER: 1
SINUS PAIN: 0
SHORTNESS OF BREATH: 0
DIARRHEA: 0
DIZZINESS: 0
CHILLS: 1
SPUTUM PRODUCTION: 0
COUGH: 0
WHEEZING: 0

## 2022-01-20 ASSESSMENT — FIBROSIS 4 INDEX: FIB4 SCORE: 0.48

## 2022-01-21 NOTE — PROGRESS NOTES
Subjective:   Agustín Brito is a 31 y.o. female who presents for Sore Throat (fever/migraine/left ear pain/x3 days )      HPI:  This is a very pleasant 31-year-old female presenting to the clinic with a sore throat, congestion left ear pain and headache x3 days.  Patient works as a CNA at [employer].  Her symptoms started abruptly on Tuesday.  She tested for COVID-19 on Wednesday.  PCR test returned negative.  Her symptoms have remained fairly constant.  She has been running a subjective fever.  Also has progressively worsening sore throat.  Painful every time she swallows.  No difficulty swallowing or handling secretions.  She denies any cough, shortness of breath or chest pain.  Has been taking Tylenol and ibuprofen which provides mild relief.  Has been vaccinated for COVID-19.  Also previously has been infected with COVID-19 and recovered.  Describes a history of strep pharyngitis.  Multiple ill contacts while at work.    Review of Systems   Constitutional: Positive for chills, fever and malaise/fatigue. Negative for diaphoresis.   HENT: Positive for congestion, ear pain and sore throat. Negative for sinus pain.    Respiratory: Negative for cough, sputum production, shortness of breath and wheezing.    Cardiovascular: Negative for chest pain and palpitations.   Gastrointestinal: Negative for abdominal pain, diarrhea, nausea and vomiting.   Musculoskeletal: Negative for myalgias.   Neurological: Positive for headaches. Negative for dizziness.       Medications:    • fluticasone  • PRENATAL 1 PO    Allergies: Penicillins    Problem List: Agustín Brito does not have any pertinent problems on file.    Surgical History:  Past Surgical History:   Procedure Laterality Date   • PB KNEE SCOPE,AID ANT CRUCIATE REPAIR Left 7/9/2019    Procedure: RECONSTRUCTION, KNEE, ACL, ARTHROSCOPIC - WITH TIBIALIS ANTERIOR ALLOGRAFT;  Surgeon: Giovanni Rivera M.D.;  Location: SURGERY [facility];  Service: Orthopedics   •  "MENISCECTOMY, KNEE, MEDIAL Left 7/9/2019    Procedure: MENISCECTOMY, KNEE, LATERAL  -PARTIAL;  Surgeon: Giovanni Rivera M.D.;  Location: SURGERY [facility];  Service: Orthopedics       Past Social Hx: Agustín Brito  reports that she has never smoked. She has never used smokeless tobacco. She reports current alcohol use of about 1.2 oz of alcohol per week. She reports that she does not use drugs.     Past Family Hx:  Agustín Brito family history includes Cancer in her maternal grandmother and mother; Hypertension in her mother; No Known Problems in her brother and father.     Problem list, medications, and allergies reviewed by myself today in Epic.     Objective:     /74 (BP Location: Right arm, Patient Position: Sitting, BP Cuff Size: Adult)   Pulse 95   Temp 37.8 °C (100.1 °F) (Temporal)   Resp 16   Ht 1.626 m (5' 4\")   Wt 94.3 kg (208 lb)   SpO2 96%   BMI 35.70 kg/m²     Physical Exam  Constitutional:       General: She is not in acute distress.     Appearance: Normal appearance. She is not ill-appearing, toxic-appearing or diaphoretic.   HENT:      Head: Normocephalic and atraumatic.      Right Ear: Ear canal and external ear normal.      Left Ear: Ear canal and external ear normal.      Ears:      Comments: Bilateral middle ear effusion.  TMs mildly injected.     Nose: Congestion and rhinorrhea present.      Mouth/Throat:      Mouth: Mucous membranes are moist.      Pharynx: Posterior oropharyngeal erythema present. No oropharyngeal exudate.      Comments: Posterior oropharynx mildly erythematous.  3+ tonsillar edema with scant exudates bilaterally.  Midline uvula.  No signs of peritonsillar abscess.  Eyes:      Conjunctiva/sclera: Conjunctivae normal.   Cardiovascular:      Rate and Rhythm: Normal rate and regular rhythm.      Pulses: Normal pulses.      Heart sounds: Normal heart sounds.   Pulmonary:      Effort: Pulmonary effort is normal.      Breath sounds: Normal breath sounds. " No wheezing, rhonchi or rales.   Musculoskeletal:      Cervical back: Normal range of motion. No muscular tenderness.   Lymphadenopathy:      Cervical: Cervical adenopathy (Anterior cervical adenopathy bilaterally.) present.   Skin:     General: Skin is warm and dry.      Capillary Refill: Capillary refill takes less than 2 seconds.   Neurological:      Mental Status: She is alert.   Psychiatric:         Mood and Affect: Mood normal.         Thought Content: Thought content normal.       POCT Strep: Positive    Assessment/Plan:     Comments/MDM:     Take antibiotic as directed.  Oral Hydration.  Warm salt water gargles.  OTC Throat lozenges or spray (Cepacol).  Tylenol and Motrin as directed for pain and fever.  Hand Hygiene: Wash hands frequently with soap and water.  Throw away toothbrush after 24 hrs on antibiotics, replace with new one.    • Follow up for persistent throat pain, increased swelling, persistent fevers, difficulty swallowing, shortness of breath, weakness, elevated heart rate, or any other concerns.      Diagnosis and associated orders:     1. Pharyngitis due to Streptococcus species  - POCT Rapid Strep A  - cefdinir (OMNICEF) 300 MG Cap; Take 1 Capsule by mouth 2 times a day for 10 days.  Dispense: 20 Capsule; Refill: 0    2. Left ear pain  - POCT Rapid Strep A           Differential diagnosis, natural history, supportive care, and indications for immediate follow-up discussed.    I personally reviewed prior external notes and test results pertinent to today's visit.     Advised the patient to follow-up with the primary care physician for recheck, reevaluation, and consideration of further management.    Please note that this dictation was created using voice recognition software. I have made reasonable attempt to correct obvious errors, but I expect that there are errors of grammar and possibly content that I did not discover before finalizing the note.    This note was electronically signed by  LAVELL Fuentes PA-C

## 2022-09-08 ENCOUNTER — OFFICE VISIT (OUTPATIENT)
Dept: URGENT CARE | Facility: CLINIC | Age: 32
End: 2022-09-08
Payer: COMMERCIAL

## 2022-09-08 VITALS
BODY MASS INDEX: 37.11 KG/M2 | HEIGHT: 64 IN | TEMPERATURE: 98.7 F | WEIGHT: 217.4 LBS | DIASTOLIC BLOOD PRESSURE: 70 MMHG | SYSTOLIC BLOOD PRESSURE: 114 MMHG | OXYGEN SATURATION: 97 % | RESPIRATION RATE: 16 BRPM | HEART RATE: 83 BPM

## 2022-09-08 DIAGNOSIS — J02.0 STREP PHARYNGITIS: ICD-10-CM

## 2022-09-08 LAB
EXTERNAL QUALITY CONTROL: NORMAL
INT CON NEG: NEGATIVE
INT CON NEG: NEGATIVE
INT CON POS: POSITIVE
INT CON POS: POSITIVE
S PYO AG THROAT QL: POSITIVE
SARS-COV+SARS-COV-2 AG RESP QL IA.RAPID: NEGATIVE

## 2022-09-08 PROCEDURE — 87426 SARSCOV CORONAVIRUS AG IA: CPT | Performed by: PHYSICIAN ASSISTANT

## 2022-09-08 PROCEDURE — 87880 STREP A ASSAY W/OPTIC: CPT | Performed by: PHYSICIAN ASSISTANT

## 2022-09-08 PROCEDURE — 99213 OFFICE O/P EST LOW 20 MIN: CPT | Performed by: PHYSICIAN ASSISTANT

## 2022-09-08 RX ORDER — PREDNISONE 20 MG/1
TABLET ORAL
COMMUNITY
Start: 2022-09-05 | End: 2022-11-10

## 2022-09-08 RX ORDER — DEXAMETHASONE SODIUM PHOSPHATE 4 MG/ML
8 INJECTION, SOLUTION INTRA-ARTICULAR; INTRALESIONAL; INTRAMUSCULAR; INTRAVENOUS; SOFT TISSUE ONCE
OUTPATIENT
Start: 2022-09-08 | End: 2022-09-11

## 2022-09-08 RX ORDER — CEPHALEXIN 500 MG/1
500 CAPSULE ORAL 2 TIMES DAILY
Qty: 20 CAPSULE | Refills: 0 | Status: SHIPPED | OUTPATIENT
Start: 2022-09-08 | End: 2022-09-18

## 2022-09-08 ASSESSMENT — ENCOUNTER SYMPTOMS
SORE THROAT: 1
FEVER: 0
COUGH: 1
CHILLS: 0

## 2022-09-08 ASSESSMENT — FIBROSIS 4 INDEX: FIB4 SCORE: 0.49

## 2022-09-08 NOTE — PROGRESS NOTES
Subjective:   Agustín Brito is a 32 y.o. female who presents today with   Chief Complaint   Patient presents with    Pharyngitis     Pt states recently traveled, 4x days, swollen uvula, cold sore        Pharyngitis   This is a new problem. Episode onset: 4 days. The problem has been unchanged. There has been no fever. Associated symptoms include coughing. Pertinent negatives include no congestion. Treatments tried: prednisone. The treatment provided no relief.   Patient was recently traveling to [place] and was treated via telemetry doc with prednisone for 4 days.    PMH:  has a past medical history of Colitis, History of strep sore throat, and Psoriasis.    She has no past medical history of Addisons disease ([facility]), Adrenal disorder ([facility]), Allergy, Anemia, Anxiety, Arrhythmia, Arthritis, Asthma, Blood transfusion without reported diagnosis, Cancer ([facility]), Cataract, CHF (congestive heart failure) ([facility]), Clotting disorder ([facility]), COPD (chronic obstructive pulmonary disease) ([facility]), Cushings syndrome ([facility]), Depression, Diabetes ([facility]), Diabetic neuropathy ([facility]), GERD (gastroesophageal reflux disease), Glaucoma, Goiter, Head ache, Heart attack ([facility]), Heart murmur, HIV (human immunodeficiency virus infection) ([facility]), Hyperlipidemia, Hypertension, Kidney disease, Meningitis, Migraine, Muscle disorder, Osteoporosis, Parathyroid disorder ([facility]), Pituitary disease ([facility]), Pulmonary emphysema ([facility]), Seizure ([facility]), Sickle cell disease ([facility]), Stroke ([facility]), Substance abuse ([facility]), Thyroid disease, Tuberculosis, or Urinary tract infection.  MEDS:   Current Outpatient Medications:     predniSONE (DELTASONE) 20 MG Tab, TAKE 2 TABLET BY MOUTH ONCE A DAY, Disp: , Rfl:     cephALEXin (KEFLEX) 500 MG Cap, Take 1 Capsule by mouth 2 times a day for 10 days., Disp: 20 Capsule, Rfl: 0    fluticasone (FLONASE ALLERGY RELIEF) 50 MCG/ACT nasal spray, Administer 1 Spray into affected nostril(S) every day., Disp: 16 g, Rfl: 0    Current  "Facility-Administered Medications:     dexamethasone (DECADRON) injection 8 mg, 8 mg, Oral, Once, Hebert Mota P.A.-C.  ALLERGIES:   Allergies   Allergen Reactions    Penicillins Hives and Rash     SURGHX:   Past Surgical History:   Procedure Laterality Date    PB KNEE SCOPE,AID ANT CRUCIATE REPAIR Left 7/9/2019    Procedure: RECONSTRUCTION, KNEE, ACL, ARTHROSCOPIC - WITH TIBIALIS ANTERIOR ALLOGRAFT;  Surgeon: Giovanni Rivera M.D.;  Location: SURGERY [facility];  Service: Orthopedics    MENISCECTOMY, KNEE, MEDIAL Left 7/9/2019    Procedure: MENISCECTOMY, KNEE, LATERAL  -PARTIAL;  Surgeon: Giovanni Rivera M.D.;  Location: SURGERY [facility];  Service: Orthopedics     SOCHX:  reports that she has never smoked. She has never used smokeless tobacco. She reports current alcohol use of about 1.2 oz per week. She reports that she does not use drugs.  FH: Reviewed with patient, not pertinent to this visit.     Review of Systems   Constitutional:  Negative for chills and fever.   HENT:  Positive for sore throat. Negative for congestion.    Respiratory:  Positive for cough.       Objective:   /70 (BP Location: Left arm, Patient Position: Sitting, BP Cuff Size: Adult)   Pulse 83   Temp 37.1 °C (98.7 °F) (Temporal)   Resp 16   Ht 1.626 m (5' 4\")   Wt 98.6 kg (217 lb 6.4 oz)   SpO2 97%   BMI 37.32 kg/m²   Physical Exam  Vitals and nursing note reviewed.   Constitutional:       General: She is not in acute distress.     Appearance: Normal appearance. She is well-developed. She is not ill-appearing or toxic-appearing.   HENT:      Head: Normocephalic and atraumatic.      Right Ear: Hearing normal.      Left Ear: Hearing normal.      Mouth/Throat:      Pharynx: Uvula midline. Posterior oropharyngeal erythema and uvula swelling present. No pharyngeal swelling.      Tonsils: No tonsillar exudate or tonsillar abscesses.        Comments: Cold sore to the lower lip on the left side.  Eyes:      " Pupils: Pupils are equal, round, and reactive to light.   Cardiovascular:      Rate and Rhythm: Normal rate and regular rhythm.      Heart sounds: Normal heart sounds.   Pulmonary:      Effort: Pulmonary effort is normal.      Breath sounds: Normal breath sounds.   Musculoskeletal:      Comments: Normal movement in all 4 extremities   Skin:     General: Skin is warm and dry.   Neurological:      Mental Status: She is alert.      Coordination: Coordination normal.   Psychiatric:         Mood and Affect: Mood normal.     STREP A +    Assessment/Plan:   Assessment    1. Strep pharyngitis  - POCT Rapid Strep A  - dexamethasone (DECADRON) injection 8 mg  - cephALEXin (KEFLEX) 500 MG Cap; Take 1 Capsule by mouth 2 times a day for 10 days.  Dispense: 20 Capsule; Refill: 0    Other orders  - predniSONE (DELTASONE) 20 MG Tab; TAKE 2 TABLET BY MOUTH ONCE A DAY  Symptoms and presentation are consistent with strep and confirmed with rapid testing today.  We will treat accordingly with antibiotics.  Patient tolerated Decadron in clinic today as well.    Differential diagnosis, natural history, supportive care, and indications for immediate follow-up discussed.   Patient given instructions and understanding of medications and treatment.    If not improving in 3-5 days, F/U with PCP or return to  if symptoms worsen.    Patient agreeable to plan.    Please note that this dictation was created using voice recognition software. I have made every reasonable attempt to correct obvious errors, but I expect that there are errors of grammar and possibly content that I did not discover before finalizing the note.    Hebert Mota PA-C

## 2022-11-10 ENCOUNTER — OFFICE VISIT (OUTPATIENT)
Dept: URGENT CARE | Facility: CLINIC | Age: 32
End: 2022-11-10
Payer: COMMERCIAL

## 2022-11-10 VITALS
OXYGEN SATURATION: 98 % | BODY MASS INDEX: 36.54 KG/M2 | WEIGHT: 214 LBS | SYSTOLIC BLOOD PRESSURE: 110 MMHG | HEART RATE: 79 BPM | RESPIRATION RATE: 16 BRPM | HEIGHT: 64 IN | TEMPERATURE: 97.4 F | DIASTOLIC BLOOD PRESSURE: 58 MMHG

## 2022-11-10 DIAGNOSIS — R09.81 NASAL CONGESTION: ICD-10-CM

## 2022-11-10 DIAGNOSIS — J02.0 STREP PHARYNGITIS: ICD-10-CM

## 2022-11-10 DIAGNOSIS — J02.9 SORE THROAT: ICD-10-CM

## 2022-11-10 DIAGNOSIS — J03.01 RECURRENT STREPTOCOCCAL TONSILLITIS: ICD-10-CM

## 2022-11-10 LAB
INT CON NEG: NEGATIVE
INT CON POS: POSITIVE
S PYO AG THROAT QL: POSITIVE

## 2022-11-10 PROCEDURE — 87880 STREP A ASSAY W/OPTIC: CPT | Performed by: NURSE PRACTITIONER

## 2022-11-10 PROCEDURE — 99214 OFFICE O/P EST MOD 30 MIN: CPT | Performed by: NURSE PRACTITIONER

## 2022-11-10 RX ORDER — DOXYCYCLINE HYCLATE 100 MG
100 TABLET ORAL 2 TIMES DAILY
Qty: 14 TABLET | Refills: 0 | Status: SHIPPED | OUTPATIENT
Start: 2022-11-10 | End: 2022-11-17

## 2022-11-10 ASSESSMENT — FIBROSIS 4 INDEX: FIB4 SCORE: 0.49

## 2022-11-10 NOTE — PROGRESS NOTES
"Subjective:   Agustín Brito is a 32 y.o. female who presents for Pharyngitis (Pt states had strep about 2 weeks ago in [place], now experiencing ear pain, chest congestion )       HPI  Patient presents for evaluation of an approximate 2-week history of sore throat, swollen tonsils, and nasal/chest congestion.  Patient was seen in urgent care in [place] approximately 2 weeks ago, diagnosed with strep pharyngitis.  She took medications as prescribed, however states her symptoms returned shortly after medications were completed.  Patient has a history of recurrent strep pharyngitis, and has consistent left enlarged tonsils.    ROS  All other systems are negative except as documented above within [facility].    MEDS:   Current Outpatient Medications:     fluticasone (FLONASE ALLERGY RELIEF) 50 MCG/ACT nasal spray, Administer 1 Spray into affected nostril(S) every day., Disp: 16 g, Rfl: 0    predniSONE (DELTASONE) 20 MG Tab, TAKE 2 TABLET BY MOUTH ONCE A DAY (Patient not taking: Reported on 11/10/2022), Disp: , Rfl:   ALLERGIES:   Allergies   Allergen Reactions    Penicillins Hives and Rash       Patient's PMH, SocHx, SurgHx, FamHx, Drug allergies and medications were reviewed.     Objective:   /58 (BP Location: Left arm, Patient Position: Sitting, BP Cuff Size: Adult)   Pulse 79   Temp 36.3 °C (97.4 °F) (Temporal)   Resp 16   Ht 1.626 m (5' 4\")   Wt 97.1 kg (214 lb)   SpO2 98%   BMI 36.73 kg/m²     Physical Exam  Vitals and nursing note reviewed.   Constitutional:       General: She is awake.      Appearance: Normal appearance. She is well-developed and normal weight.   HENT:      Head: Normocephalic and atraumatic.      Right Ear: Tympanic membrane, ear canal and external ear normal.      Left Ear: Tympanic membrane, ear canal and external ear normal.      Nose: No nasal tenderness, mucosal edema, congestion or rhinorrhea.      Right Sinus: No frontal sinus tenderness.      Left Sinus: No frontal sinus tenderness. "      Mouth/Throat:      Lips: Pink.      Mouth: Mucous membranes are moist.      Pharynx: Oropharynx is clear. Uvula midline. Posterior oropharyngeal erythema and uvula swelling present.      Tonsils: 3+ on the right. 3+ on the left.   Eyes:      Extraocular Movements: Extraocular movements intact.      Conjunctiva/sclera: Conjunctivae normal.   Cardiovascular:      Rate and Rhythm: Normal rate and regular rhythm.      Pulses: Normal pulses.      Heart sounds: Normal heart sounds.   Pulmonary:      Effort: Pulmonary effort is normal.      Breath sounds: Normal breath sounds.   Musculoskeletal:         General: Normal range of motion.      Cervical back: Normal range of motion and neck supple.   Lymphadenopathy:      Head:      Right side of head: Tonsillar adenopathy present.      Left side of head: Tonsillar adenopathy present.   Skin:     General: Skin is warm and dry.   Neurological:      General: No focal deficit present.      Mental Status: She is alert and oriented to person, place, and time.   Psychiatric:         Mood and Affect: Mood normal.         Behavior: Behavior normal. Behavior is cooperative.         Thought Content: Thought content normal.         Judgment: Judgment normal.       Assessment/Plan:   Assessment    1. Strep pharyngitis  - doxycycline (VIBRAMYCIN) 100 MG Tab; Take 1 Tablet by mouth 2 times a day for 7 days.  Dispense: 14 Tablet; Refill: 0    2. Recurrent streptococcal tonsillitis  - Referral to ENT  - POCT Rapid Strep A    3. Nasal congestion  - POCT Rapid Strep A    4. Sore throat  - POCT Rapid Strep A      Vital signs stable at today's acute urgent care visit.  Review of any test results completed in clinic.  Begin medications as listed.  Referral to ENT due to consistent left enlarged tonsil as well as recurrent strep pharyngitis.    Advised the patient to follow-up with the primary care provider/urgent care if symptoms persist.  Red flags discussed and indications to immediately  call 911 or present to the ED. All questions were encouraged and answered to the patient's satisfaction and understanding, and they agree to the plan of care.     This is an acute problem with uncertain prognosis, medication management and instructions as well as management options were provided.  I personally reviewed prior external notes and test results pertinent to today and independently reviewed and interpreted all diagnostics. Time spent evaluating this patient includes preparing for visit, counseling/education, exam, evaluation, obtaining history, and ordering lab/test/procedures.      Please note that this dictation was created using voice recognition software. I have made a reasonable attempt to correct obvious errors, but I expect that there are errors of grammar and possibly content that I did not discover before finalizing the note.

## 2022-11-12 ENCOUNTER — TELEPHONE (OUTPATIENT)
Dept: URGENT CARE | Facility: CLINIC | Age: 32
End: 2022-11-12
Payer: COMMERCIAL

## 2022-11-12 NOTE — TELEPHONE ENCOUNTER
Pt sent a message through [employer], in regards to referral to ENT. I called pt letting her know all of the referrals we send out goes to the referral department. I stated if she had any additional questions or an update to call [facility]  [phone] or Referrals Department [phone].

## 2023-01-11 ENCOUNTER — OFFICE VISIT (OUTPATIENT)
Dept: MEDICAL GROUP | Facility: PHYSICIAN GROUP | Age: 33
End: 2023-01-11
Payer: COMMERCIAL

## 2023-01-11 VITALS
DIASTOLIC BLOOD PRESSURE: 78 MMHG | OXYGEN SATURATION: 96 % | TEMPERATURE: 97.3 F | RESPIRATION RATE: 12 BRPM | WEIGHT: 221 LBS | HEIGHT: 64 IN | HEART RATE: 96 BPM | BODY MASS INDEX: 37.73 KG/M2 | SYSTOLIC BLOOD PRESSURE: 128 MMHG

## 2023-01-11 DIAGNOSIS — Z00.00 ANNUAL PHYSICAL EXAM: ICD-10-CM

## 2023-01-11 DIAGNOSIS — E66.9 OBESITY (BMI 30-39.9): ICD-10-CM

## 2023-01-11 DIAGNOSIS — J35.1 SWOLLEN TONSIL: ICD-10-CM

## 2023-01-11 DIAGNOSIS — E28.2 PCOS (POLYCYSTIC OVARIAN SYNDROME): ICD-10-CM

## 2023-01-11 DIAGNOSIS — Z30.09 FAMILY PLANNING: ICD-10-CM

## 2023-01-11 DIAGNOSIS — N91.2 AMENORRHEA: ICD-10-CM

## 2023-01-11 PROCEDURE — 99214 OFFICE O/P EST MOD 30 MIN: CPT | Performed by: NURSE PRACTITIONER

## 2023-01-11 ASSESSMENT — PATIENT HEALTH QUESTIONNAIRE - PHQ9: CLINICAL INTERPRETATION OF PHQ2 SCORE: 0

## 2023-01-11 ASSESSMENT — FIBROSIS 4 INDEX: FIB4 SCORE: 0.49

## 2023-01-11 NOTE — PROGRESS NOTES
CC:  Chief Complaint   Patient presents with    Follow-Up       HISTORY OF PRESENT ILLNESS: Patient is a 32 y.o. female established patient presenting     Family planning  Chronic and stable condition.    Her and her  have been working on her family for the last 18 months with no forward progress.  Patient notes that she continues to have irregular periods at times.  She did recently follow-up with OB Gyn and had hormone testing completed.     Latest Reference Range & Units 11/22/21 09:56   Anti-Mullerian Hormone 0.176 - 11.705 ng/mL 36.635 (H)   Follicle Stimulating Hormone mIU/mL 6.5   Progesterone ng/mL 0.06   Prolactin 2.80 - 26.00 ng/mL 15.00   Sex Hormone Bind Globulin 30 - 135 nmol/L 27 (L)   Testosterone Fr LCMS 0.8 - 7.4 pg/mL 7.9 (H)   Testosterone,Total 9 - 55 ng/dL 43   After following up with OB/GYN regarding these results she was told that this looks more as if she had PCOS and was referred to reproductive endocrinology.  She followed up with Dr. Roque who discussed possibly starting her with IVF however at this time patient does not feel that this is the right approach for her and went to see other options.  She did research and look up PCOS dietary and has slowly been working on that.  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      Swollen tonsil  Chronic and stable condition.  Patient has appointment on 1/26/2023 to get a tonsillectomy.       Allergies:Penicillins    Current Outpatient Medications   Medication Sig Dispense Refill    metFORMIN (GLUCOPHAGE) 500 MG Tab Take 1 Tablet by mouth every day for 14 days, THEN 1 Tablet 2 times a day for 14 days. 42 Tablet 3    fluticasone (FLONASE ALLERGY RELIEF) 50 MCG/ACT nasal spray Administer 1 Spray into affected nostril(S) every day. 16 g 0    HYDROcodone-acetaminophen 2.5-108 mg/5mL (HYCET) 7.5-325 MG/15ML solution  (Patient not taking: Reported on 1/11/2023)       No current facility-administered  "medications for this visit.     Past Medical History:   Diagnosis Date    Colitis     History of strep sore throat     Psoriasis      Past Surgical History:   Procedure Laterality Date    PB KNEE SCOPE,AID ANT CRUCIATE REPAIR Left 7/9/2019    Procedure: RECONSTRUCTION, KNEE, ACL, ARTHROSCOPIC - WITH TIBIALIS ANTERIOR ALLOGRAFT;  Surgeon: Giovanni Rivera M.D.;  Location: SURGERY [facility];  Service: Orthopedics    MENISCECTOMY, KNEE, MEDIAL Left 7/9/2019    Procedure: MENISCECTOMY, KNEE, LATERAL  -PARTIAL;  Surgeon: Giovanni Rivera M.D.;  Location: SURGERY [facility];  Service: Orthopedics     Social History     Tobacco Use    Smoking status: Never    Smokeless tobacco: Never   Vaping Use    Vaping Use: Never used   Substance Use Topics    Alcohol use: Yes     Alcohol/week: 1.2 oz     Types: 2 Glasses of wine per week     Comment: occ    Drug use: No     Social History     Social History Narrative    Not on file       Family History   Problem Relation Age of Onset    Cancer Mother         breast     Hypertension Mother     No Known Problems Father     No Known Problems Brother     Cancer Maternal Grandmother         breast cancer        ROS  See HPI      - NOTE: All other systems reviewed and are negative, except as in HPI.      Exam:    /78 (BP Location: Left arm, Patient Position: Sitting, BP Cuff Size: Adult)   Pulse 96   Temp 36.3 °C (97.3 °F) (Temporal)   Resp 12   Ht 1.626 m (5' 4\")   Wt 100 kg (221 lb)   SpO2 96%  Body mass index is 37.93 kg/m².    General: Alert, pleasant, NAD  EYES:   PERRL, EOMI, no icterus or pallor.  Conjunctivae and lids normal.   HENT:  Normocephalic.  External ears normal.   Musculoskeletal: Gait is normal.  Moves all extremities well.    Extremities: No clubbing, cyanosis or edema noted.   Neurological: No tremors, sensation grossly intact, tone/strength normal, gait is normal.  Psych:  Affect/mood is normal, judgement is good, memory is intact, " grooming is appropriate.      LABS: 11/22/2021: Results reviewed and discussed with the patient, questions answered.    Assessment/Plan:    1. Obesity (BMI 30-39.9)  - metFORMIN (GLUCOPHAGE) 500 MG Tab; Take 1 Tablet by mouth every day for 14 days, THEN 1 Tablet 2 times a day for 14 days.  Dispense: 42 Tablet; Refill: 3    2. Family planning  - metFORMIN (GLUCOPHAGE) 500 MG Tab; Take 1 Tablet by mouth every day for 14 days, THEN 1 Tablet 2 times a day for 14 days.  Dispense: 42 Tablet; Refill: 3    3. Amenorrhea  - metFORMIN (GLUCOPHAGE) 500 MG Tab; Take 1 Tablet by mouth every day for 14 days, THEN 1 Tablet 2 times a day for 14 days.  Dispense: 42 Tablet; Refill: 3    4. PCOS (polycystic ovarian syndrome)  - metFORMIN (GLUCOPHAGE) 500 MG Tab; Take 1 Tablet by mouth every day for 14 days, THEN 1 Tablet 2 times a day for 14 days.  Dispense: 42 Tablet; Refill: 3    5. Annual physical exam  - CBC WITHOUT DIFFERENTIAL; Future  - Comp Metabolic Panel; Future  - metFORMIN (GLUCOPHAGE) 500 MG Tab; Take 1 Tablet by mouth every day for 14 days, THEN 1 Tablet 2 times a day for 14 days.  Dispense: 42 Tablet; Refill: 3    6. Swollen tonsil  Continue with ENT surgery       Discussed with patient possible alternative diagnoses, patient is to take all medications as prescribed.     If symptoms persist FU w/PCP, if symptoms worsen go to emergency room.     If experiencing any side effects from prescribed medications reports to the office immediately or go to emergency room.    Reviewed indication, dosage, usage and potential adverse effects of prescribed medications.     Reviewed risks and benefits of treatment plan. Patient verbalizes understanding of all instruction and verbally agrees to plan.      Return in about 4 weeks (around 2/8/2023) for Follow-up medication efficacy.      Please note that this dictation was created using voice recognition software. I have made every reasonable attempt to correct obvious errors, but I  expect that there are errors of grammar and possibly content that I did not discover before finalizing the note.

## 2023-01-11 NOTE — ASSESSMENT & PLAN NOTE
Chronic and stable condition.    Her and her  have been working on her family for the last 18 months with no forward progress.  Patient notes that she continues to have irregular periods at times.  She did recently follow-up with OB Gyn and had hormone testing completed.     Latest Reference Range & Units 11/22/21 09:56   Anti-Mullerian Hormone 0.176 - 11.705 ng/mL 36.635 (H)   Follicle Stimulating Hormone mIU/mL 6.5   Progesterone ng/mL 0.06   Prolactin 2.80 - 26.00 ng/mL 15.00   Sex Hormone Bind Globulin 30 - 135 nmol/L 27 (L)   Testosterone Fr LCMS 0.8 - 7.4 pg/mL 7.9 (H)   Testosterone,Total 9 - 55 ng/dL 43   After following up with OB/GYN regarding these results she was told that this looks more as if she had PCOS and was referred to reproductive endocrinology.  She followed up with Dr. Roque who discussed possibly starting her with IVF however at this time patient does not feel that this is the right approach for her and went to see other options.  She did research and look up PCOS dietary and has slowly been working on that.  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

## 2023-05-23 ENCOUNTER — OFFICE VISIT (OUTPATIENT)
Dept: URGENT CARE | Facility: CLINIC | Age: 33
End: 2023-05-23
Payer: COMMERCIAL

## 2023-05-23 VITALS
HEIGHT: 64 IN | RESPIRATION RATE: 14 BRPM | TEMPERATURE: 97.2 F | BODY MASS INDEX: 38.38 KG/M2 | OXYGEN SATURATION: 93 % | DIASTOLIC BLOOD PRESSURE: 68 MMHG | SYSTOLIC BLOOD PRESSURE: 102 MMHG | WEIGHT: 224.8 LBS | HEART RATE: 82 BPM

## 2023-05-23 DIAGNOSIS — H69.92 EUSTACHIAN TUBE DYSFUNCTION, LEFT: ICD-10-CM

## 2023-05-23 DIAGNOSIS — H92.02 OTALGIA OF LEFT EAR: ICD-10-CM

## 2023-05-23 PROCEDURE — 99213 OFFICE O/P EST LOW 20 MIN: CPT | Performed by: PHYSICIAN ASSISTANT

## 2023-05-23 PROCEDURE — 3078F DIAST BP <80 MM HG: CPT | Performed by: PHYSICIAN ASSISTANT

## 2023-05-23 PROCEDURE — 3074F SYST BP LT 130 MM HG: CPT | Performed by: PHYSICIAN ASSISTANT

## 2023-05-23 ASSESSMENT — ENCOUNTER SYMPTOMS
VOMITING: 0
COUGH: 0
WHEEZING: 0
ABDOMINAL PAIN: 0
SPUTUM PRODUCTION: 0
FEVER: 0
CHILLS: 0
SINUS PAIN: 0
MYALGIAS: 0
DIZZINESS: 0
SORE THROAT: 0
DIAPHORESIS: 0
HEADACHES: 0
NAUSEA: 0
SHORTNESS OF BREATH: 0

## 2023-05-23 NOTE — PROGRESS NOTES
Subjective:     CHIEF COMPLAINT  Chief Complaint   Patient presents with    Otalgia     L ear pain x 2 days       HPI  Agustín Brito is a very pleasant 32 y.o. female who presents to the clinic with left ear pain x2 days.  Patient describes a dull pressure-like sensation to the left ear.  Occasionally experiences pain that radiates down the left side of her neck to her throat when swallowing or yawning.  Denies any hearing loss or tinnitus.  No ear discharge or drainage.  No fevers, chills, cough or body aches.  She does have a history of seasonal allergies for which she takes Zyrtec daily.  No additional over-the-counter medications have been started at this time.    REVIEW OF SYSTEMS  Review of Systems   Constitutional:  Negative for chills, diaphoresis, fever and malaise/fatigue.   HENT:  Positive for congestion and ear pain. Negative for ear discharge, sinus pain, sore throat and tinnitus.    Respiratory:  Negative for cough, sputum production, shortness of breath and wheezing.    Gastrointestinal:  Negative for abdominal pain, nausea and vomiting.   Musculoskeletal:  Negative for myalgias.   Neurological:  Negative for dizziness and headaches.   Endo/Heme/Allergies:  Positive for environmental allergies.       PAST MEDICAL HISTORY  Patient Active Problem List    Diagnosis Date Noted    Family planning 03/31/2021    Amenorrhea 03/31/2021    Obesity (BMI 30-39.9) 03/17/2021    Psoriasis     History of COVID-19     Swollen tonsil     Sinus problem        SURGICAL HISTORY   has a past surgical history that includes knee scope,aid ant cruciate repair (Left, 7/9/2019) and meniscectomy, knee, medial (Left, 7/9/2019).    ALLERGIES  Allergies   Allergen Reactions    Penicillins Hives and Rash       CURRENT MEDICATIONS  Home Medications       Reviewed by Chucky Fuentes P.A.-C. (Physician Assistant) on 05/23/23 at 1355  Med List Status: <None>     Medication Last Dose Status   fluticasone (FLONASE ALLERGY RELIEF) 50  "MCG/ACT nasal spray PRN Active   HYDROcodone-acetaminophen 2.5-108 mg/5mL (HYCET) 7.5-325 MG/15ML solution PRN Active                    SOCIAL HISTORY  Social History     Tobacco Use    Smoking status: Never    Smokeless tobacco: Never   Vaping Use    Vaping Use: Never used   Substance and Sexual Activity    Alcohol use: Yes     Alcohol/week: 1.2 oz     Types: 2 Glasses of wine per week     Comment: occ    Drug use: No    Sexual activity: Yes     Partners: Male     Birth control/protection: None       FAMILY HISTORY  Family History   Problem Relation Age of Onset    Cancer Mother         breast     Hypertension Mother     No Known Problems Father     No Known Problems Brother     Cancer Maternal Grandmother         breast cancer          Objective:     VITAL SIGNS: /68 (BP Location: Left arm, Patient Position: Sitting, BP Cuff Size: Adult)   Pulse 82   Temp 36.2 °C (97.2 °F) (Temporal)   Resp 14   Ht 1.626 m (5' 4\")   Wt 102 kg (224 lb 12.8 oz)   SpO2 93%   BMI 38.59 kg/m²     PHYSICAL EXAM  Physical Exam  Constitutional:       General: She is not in acute distress.     Appearance: Normal appearance. She is not ill-appearing, toxic-appearing or diaphoretic.   HENT:      Head: Normocephalic and atraumatic.      Right Ear: Tympanic membrane, ear canal and external ear normal.      Left Ear: Ear canal and external ear normal.      Ears:      Comments: Left-sided serous middle ear effusion.  TM nonerythematous.  No purulence.  Canal patent.     Nose: Congestion and rhinorrhea present.      Mouth/Throat:      Mouth: Mucous membranes are moist.      Pharynx: No oropharyngeal exudate or posterior oropharyngeal erythema.   Eyes:      Conjunctiva/sclera: Conjunctivae normal.   Cardiovascular:      Rate and Rhythm: Normal rate and regular rhythm.      Pulses: Normal pulses.      Heart sounds: Normal heart sounds.   Pulmonary:      Effort: Pulmonary effort is normal.      Breath sounds: Normal breath sounds. No " wheezing.   Musculoskeletal:      Cervical back: Normal range of motion. No muscular tenderness.   Lymphadenopathy:      Cervical: No cervical adenopathy.   Skin:     General: Skin is warm and dry.      Capillary Refill: Capillary refill takes less than 2 seconds.   Neurological:      Mental Status: She is alert.   Psychiatric:         Mood and Affect: Mood normal.         Thought Content: Thought content normal.         Assessment/Plan:     1. Otalgia of left ear    2. Eustachian tube dysfunction, left      MDM/Comments:    Findings consistent with eustachian tube dysfunction with associated left-sided serous middle ear effusion.  Advised starting Flonase, OTC antihistamine and Sudafed.  Discussed return precautions for any persistence or worsening of symptoms.  Please feel free to call with any questions or concerns.    Differential diagnosis, natural history, supportive care, and indications for immediate follow-up discussed. All questions answered. Patient agrees with the plan of care.    Follow-up as needed if symptoms worsen or fail to improve to PCP, Urgent care or Emergency Room.    I have personally reviewed prior external notes and test results pertinent to today's visit.  I have independently reviewed and interpreted all diagnostics ordered during this urgent care acute visit.   Discussed management options (risks,benefits, and alternatives to treatment). Pt expresses understanding and the treatment plan was agreed upon. Questions were encouraged and answered to pt's satisfaction.    Please note that this dictation was created using voice recognition software. I have made a reasonable attempt to correct obvious errors, but I expect that there are errors of grammar and possibly content that I did not discover before finalizing the note.

## 2024-11-08 ENCOUNTER — OFFICE VISIT (OUTPATIENT)
Dept: URGENT CARE | Facility: CLINIC | Age: 34
End: 2024-11-08
Payer: COMMERCIAL

## 2024-11-08 VITALS
TEMPERATURE: 101.8 F | BODY MASS INDEX: 36.37 KG/M2 | RESPIRATION RATE: 18 BRPM | OXYGEN SATURATION: 95 % | WEIGHT: 213 LBS | DIASTOLIC BLOOD PRESSURE: 70 MMHG | HEIGHT: 64 IN | HEART RATE: 106 BPM | SYSTOLIC BLOOD PRESSURE: 132 MMHG

## 2024-11-08 DIAGNOSIS — J02.9 PHARYNGITIS, UNSPECIFIED ETIOLOGY: ICD-10-CM

## 2024-11-08 DIAGNOSIS — R50.9 FEVER, UNSPECIFIED FEVER CAUSE: ICD-10-CM

## 2024-11-08 DIAGNOSIS — J10.1 INFLUENZA B: ICD-10-CM

## 2024-11-08 LAB
FLUAV RNA SPEC QL NAA+PROBE: NEGATIVE
FLUBV RNA SPEC QL NAA+PROBE: POSITIVE
RSV RNA SPEC QL NAA+PROBE: NEGATIVE
S PYO DNA SPEC NAA+PROBE: NOT DETECTED
SARS-COV-2 RNA RESP QL NAA+PROBE: NEGATIVE

## 2024-11-08 PROCEDURE — 0241U POCT CEPHEID COV-2, FLU A/B, RSV - PCR: CPT

## 2024-11-08 PROCEDURE — 99214 OFFICE O/P EST MOD 30 MIN: CPT

## 2024-11-08 PROCEDURE — 87651 STREP A DNA AMP PROBE: CPT

## 2024-11-08 PROCEDURE — 3075F SYST BP GE 130 - 139MM HG: CPT

## 2024-11-08 PROCEDURE — 3078F DIAST BP <80 MM HG: CPT

## 2024-11-08 RX ORDER — BENZONATATE 100 MG/1
100 CAPSULE ORAL 3 TIMES DAILY PRN
Qty: 60 CAPSULE | Refills: 0 | Status: SHIPPED | OUTPATIENT
Start: 2024-11-08

## 2024-11-08 RX ORDER — OSELTAMIVIR PHOSPHATE 75 MG/1
75 CAPSULE ORAL 2 TIMES DAILY
Qty: 10 CAPSULE | Refills: 0 | Status: SHIPPED | OUTPATIENT
Start: 2024-11-08 | End: 2024-11-13

## 2024-11-08 RX ORDER — ALBUTEROL SULFATE 90 UG/1
2 INHALANT RESPIRATORY (INHALATION) EVERY 6 HOURS PRN
Qty: 8.5 G | Refills: 0 | Status: SHIPPED | OUTPATIENT
Start: 2024-11-08

## 2024-11-08 ASSESSMENT — ENCOUNTER SYMPTOMS
SORE THROAT: 1
MYALGIAS: 1
CHILLS: 1
FEVER: 1
COUGH: 1

## 2024-11-08 NOTE — LETTER
November 8, 2024    To Whom It May Concern:         This is confirmation that Agustín Brito attended her scheduled appointment with Jolene Griffith P.A.-C. on 11/08/24. Please excuse her absence from work today. She may return in 1-3 days if feeling well.          If you have any questions please do not hesitate to call me at the phone number listed below.    Sincerely,          Jolene Griffith P.A.-C.  [phone]

## 2024-11-09 NOTE — PROGRESS NOTES
Subjective:     CHIEF COMPLAINT  Chief Complaint   Patient presents with    Sore Throat     X 3 days    Cough    Nasal Congestion    Headache    Chills    Body Aches       HPI  Agustín Brito is a very pleasant 34 y.o. female who presents with a fever, chills, body aches, a sore throat, congestion, and a cough productive of phlegm that has been present for 2 days.  Her symptoms started Wednesday night.  She performed a nebulized albuterol breathing treatment at work and was able to cough up a large amount of phlegm with some improvement in symptoms.  She does report that her chest feels tight.  She denies any chest pain.  She denies a known history of asthma.  She has been around several sick individuals at work.    REVIEW OF SYSTEMS  Review of Systems   Constitutional:  Positive for chills, fever and malaise/fatigue.   HENT:  Positive for congestion, ear pain and sore throat.    Respiratory:  Positive for cough.    Cardiovascular:  Negative for chest pain.   Musculoskeletal:  Positive for myalgias.       PAST MEDICAL HISTORY  Patient Active Problem List    Diagnosis Date Noted    Family planning 03/31/2021    Amenorrhea 03/31/2021    Obesity (BMI 30-39.9) 03/17/2021    Psoriasis     History of COVID-19     Swollen tonsil     Sinus problem        SURGICAL HISTORY   has a past surgical history that includes knee scope,aid ant cruciate repair (Left, 7/9/2019) and meniscectomy, knee, medial (Left, 7/9/2019).    ALLERGIES  Allergies   Allergen Reactions    Penicillins Hives and Rash       CURRENT MEDICATIONS  Home Medications       Reviewed by Jolene Griffith P.A.-C. (Physician Assistant) on 11/08/24 at 1641  Med List Status: <None>     Medication Last Dose Status   fluticasone (FLONASE ALLERGY RELIEF) 50 MCG/ACT nasal spray PRN Active   HYDROcodone-acetaminophen 2.5-108 mg/5mL (HYCET) 7.5-325 MG/15ML solution PRN Active   meloxicam (MOBIC) 7.5 MG Tab Taking Active                    SOCIAL HISTORY  Social History  "    Tobacco Use    Smoking status: Never    Smokeless tobacco: Never   Vaping Use    Vaping status: Never Used   Substance and Sexual Activity    Alcohol use: Yes     Alcohol/week: 1.2 oz     Types: 2 Glasses of wine per week     Comment: occ    Drug use: No    Sexual activity: Yes     Partners: Male     Birth control/protection: None       FAMILY HISTORY  Family History   Problem Relation Age of Onset    Cancer Mother         breast     Hypertension Mother     No Known Problems Father     No Known Problems Brother     Cancer Maternal Grandmother         breast cancer          Objective:     VITAL SIGNS: /70   Pulse (!) 106   Temp (!) 38.8 °C (101.8 °F) (Temporal)   Resp 18   Ht 1.626 m (5' 4\")   Wt 96.6 kg (213 lb)   SpO2 95%   BMI 36.56 kg/m²     PHYSICAL EXAM  Physical Exam  Vitals reviewed.   Constitutional:       General: She is not in acute distress.     Appearance: Normal appearance. She is ill-appearing and diaphoretic. She is not toxic-appearing.   HENT:      Head: Normocephalic and atraumatic.      Right Ear: Tympanic membrane, ear canal and external ear normal.      Left Ear: Tympanic membrane, ear canal and external ear normal.      Nose: Congestion present.      Mouth/Throat:      Mouth: Mucous membranes are moist.      Pharynx: Oropharynx is clear. No oropharyngeal exudate or posterior oropharyngeal erythema.   Eyes:      Conjunctiva/sclera: Conjunctivae normal.   Cardiovascular:      Rate and Rhythm: Regular rhythm. Tachycardia present.      Heart sounds: Normal heart sounds.   Pulmonary:      Effort: Pulmonary effort is normal. No respiratory distress.      Breath sounds: Normal breath sounds. No stridor. No wheezing, rhonchi or rales.   Lymphadenopathy:      Cervical: No cervical adenopathy.   Skin:     General: Skin is warm.      Coloration: Skin is not pale.   Neurological:      General: No focal deficit present.      Mental Status: She is alert.         Assessment/Plan:     1. Fever, " unspecified fever cause  - POCT Cepheid Group A Strep - PCR  - POCT Cepheid CoV-2, Flu A/B, RSV - PCR    2. Pharyngitis, unspecified etiology    3. Influenza B  - albuterol 108 (90 Base) MCG/ACT Aero Soln inhalation aerosol; Inhale 2 Puffs every 6 hours as needed for Shortness of Breath.  Dispense: 8.5 g; Refill: 0  - benzonatate (TESSALON) 100 MG Cap; Take 1 Capsule by mouth 3 times a day as needed for Cough.  Dispense: 60 Capsule; Refill: 0  - oseltamivir (TAMIFLU) 75 MG Cap; Take 1 Capsule by mouth 2 times a day for 5 days.  Dispense: 10 Capsule; Refill: 0  -Warm salt water gargles as needed for sore throat  -Tylenol/ibuprofen OTC as needed for pain  -Return to clinic if symptoms worsen or fail to resolve      MDM/Comments:  Patient is displaying systemic symptoms including fever and tachycardia on physical examination. These symptoms are likely contributed to Influenza B. Patient has stable vital signs and is non-toxic appearing.  Strep and viral testing for COVID, influenza, and RSV performed in office with positive influenza B results.  Tamiflu has been sent to the patient's pharmacy.  Patient provided an albuterol inhaler as well as benzonatate for cough relief.  Discussed supportive care measures with warm salt water gargles, rest, tylenol/ibuprofen OTC as needed for pain, and maintaining adequate hydration. Patient demonstrated understanding of treatment plan and agreed to return to the clinic if symptoms worsen or fail to resolve.       Differential diagnosis, natural history, supportive care, and indications for immediate follow-up discussed. All questions answered. Patient agrees with the plan of care.    Follow-up as needed if symptoms worsen or fail to improve to PCP, Urgent care or Emergency Room.    I have personally reviewed prior external notes and test results pertinent to today's visit.  I have independently reviewed and interpreted all diagnostics ordered during this urgent care acute visit.    Discussed management options (risks,benefits, and alternatives to treatment). Pt expresses understanding and the treatment plan was agreed upon. Questions were encouraged and answered to pt's satisfaction.    Please note that this dictation was created using voice recognition software. I have made a reasonable attempt to correct obvious errors, but I expect that there are errors of grammar and possibly content that I did not discover before finalizing the note.

## 2025-02-01 ENCOUNTER — OFFICE VISIT (OUTPATIENT)
Dept: URGENT CARE | Facility: CLINIC | Age: 35
End: 2025-02-01
Payer: COMMERCIAL

## 2025-02-01 VITALS
BODY MASS INDEX: 37.15 KG/M2 | RESPIRATION RATE: 18 BRPM | DIASTOLIC BLOOD PRESSURE: 84 MMHG | SYSTOLIC BLOOD PRESSURE: 122 MMHG | WEIGHT: 217.6 LBS | HEART RATE: 104 BPM | HEIGHT: 64 IN | OXYGEN SATURATION: 94 % | TEMPERATURE: 97.6 F

## 2025-02-01 DIAGNOSIS — J06.9 VIRAL UPPER RESPIRATORY TRACT INFECTION WITH COUGH: ICD-10-CM

## 2025-02-01 DIAGNOSIS — M79.10 MYALGIA DUE TO VIRAL INFECTION: ICD-10-CM

## 2025-02-01 DIAGNOSIS — B97.89 MYALGIA DUE TO VIRAL INFECTION: ICD-10-CM

## 2025-02-01 DIAGNOSIS — R09.81 CONGESTION OF NASAL SINUS: ICD-10-CM

## 2025-02-01 DIAGNOSIS — J02.9 VIRAL PHARYNGITIS: ICD-10-CM

## 2025-02-01 PROCEDURE — 99214 OFFICE O/P EST MOD 30 MIN: CPT

## 2025-02-01 PROCEDURE — 3079F DIAST BP 80-89 MM HG: CPT

## 2025-02-01 PROCEDURE — 3074F SYST BP LT 130 MM HG: CPT

## 2025-02-01 RX ORDER — GUAIFENESIN 600 MG/1
600 TABLET, EXTENDED RELEASE ORAL EVERY 12 HOURS
Qty: 28 TABLET | Refills: 0 | Status: SHIPPED | OUTPATIENT
Start: 2025-02-01 | End: 2025-02-15

## 2025-02-01 RX ORDER — BENZONATATE 100 MG/1
100 CAPSULE ORAL 3 TIMES DAILY PRN
Qty: 60 CAPSULE | Refills: 0 | Status: SHIPPED | OUTPATIENT
Start: 2025-02-01

## 2025-02-01 RX ORDER — CYCLOBENZAPRINE HCL 10 MG
10 TABLET ORAL NIGHTLY PRN
Qty: 5 TABLET | Refills: 0 | Status: SHIPPED | OUTPATIENT
Start: 2025-02-01 | End: 2025-02-06

## 2025-02-01 ASSESSMENT — ENCOUNTER SYMPTOMS
DIZZINESS: 0
NAUSEA: 0
ABDOMINAL PAIN: 0
VOMITING: 0
HOARSE VOICE: 1
LOSS OF CONSCIOUSNESS: 0
MYALGIAS: 1
WEAKNESS: 1
FALLS: 0
SHORTNESS OF BREATH: 0
CHILLS: 1
COUGH: 1
NECK PAIN: 0
SWOLLEN GLANDS: 1
TROUBLE SWALLOWING: 1
SPUTUM PRODUCTION: 1
DIARRHEA: 0
STRIDOR: 0
SORE THROAT: 1
PALPITATIONS: 0
BLURRED VISION: 0
FEVER: 0
HEADACHES: 1
FOCAL WEAKNESS: 0
SINUS PAIN: 1

## 2025-02-01 NOTE — PROGRESS NOTES
Subjective:     Agustín Brito is a 34 y.o. female who presents for Pharyngitis (Bilateral ear px, cough, mucus x 1 week )      Pharyngitis   This is a new problem. The current episode started in the past 7 days. The problem has been rapidly worsening. The pain is worse on the left side. There has been no fever. The pain is at a severity of 5/10. Associated symptoms include congestion, coughing, ear pain, headaches, a hoarse voice, a plugged ear sensation, swollen glands and trouble swallowing. Pertinent negatives include no abdominal pain, diarrhea, drooling, ear discharge, neck pain, shortness of breath, stridor or vomiting. She has had no exposure to strep or mono. She has tried acetaminophen and NSAIDs (mucinex) for the symptoms. The treatment provided no relief.       Review of Systems   Constitutional:  Positive for chills and malaise/fatigue. Negative for fever.   HENT:  Positive for congestion, ear pain, hoarse voice, sinus pain, sore throat and trouble swallowing. Negative for drooling and ear discharge.    Eyes:  Negative for blurred vision.   Respiratory:  Positive for cough and sputum production. Negative for shortness of breath and stridor.    Cardiovascular:  Negative for chest pain, palpitations and leg swelling.   Gastrointestinal:  Negative for abdominal pain, diarrhea, nausea and vomiting.   Musculoskeletal:  Positive for myalgias. Negative for falls and neck pain.   Skin:  Negative for itching and rash.   Neurological:  Positive for weakness and headaches. Negative for dizziness, focal weakness and loss of consciousness.        CURRENT MEDICATIONS:  albuterol Aers  benzonatate Caps  fluticasone  HYDROcodone-acetaminophen 2.5-108 mg/5mL  meloxicam Tabs    Allergies:     Allergies   Allergen Reactions    Penicillins Hives and Rash       Current Problems: Agustín Brito does not have any pertinent problems on file.  Past Surgical Hx:    Past Surgical History:   Procedure Laterality Date    PB KNEE  "SCOPE,AID ANT CRUCIATE REPAIR Left 7/9/2019    Procedure: RECONSTRUCTION, KNEE, ACL, ARTHROSCOPIC - WITH TIBIALIS ANTERIOR ALLOGRAFT;  Surgeon: Giovanni Rivera M.D.;  Location: SURGERY [facility];  Service: Orthopedics    MENISCECTOMY, KNEE, MEDIAL Left 7/9/2019    Procedure: MENISCECTOMY, KNEE, LATERAL  -PARTIAL;  Surgeon: Giovanni Rivera M.D.;  Location: SURGERY [facility];  Service: Orthopedics      Past Social Hx:  reports that she has never smoked. She has never used smokeless tobacco. She reports current alcohol use of about 1.2 oz of alcohol per week. She reports that she does not use drugs.   Past Family Hx:  Agustín Brito family history includes Cancer in her maternal grandmother and mother; Hypertension in her mother; No Known Problems in her brother and father.     (Allergies, Medications, & Tobacco/Substance Use were reconciled by the Medical Assistant and reviewed by myself. The family history is prepopulated)       Objective:     /84   Pulse (!) 104   Temp 36.4 °C (97.6 °F) (Temporal)   Resp 18   Ht 1.626 m (5' 4\")   Wt 98.7 kg (217 lb 9.6 oz)   SpO2 94%   BMI 37.35 kg/m²     Physical Exam  Constitutional:       General: She is not in acute distress.     Appearance: Normal appearance. She is ill-appearing. She is not toxic-appearing or diaphoretic.   HENT:      Head: Normocephalic and atraumatic.      Nose: Congestion and rhinorrhea present.      Mouth/Throat:      Pharynx: Posterior oropharyngeal erythema present. No oropharyngeal exudate.   Eyes:      General: No scleral icterus.        Right eye: No discharge.         Left eye: No discharge.   Cardiovascular:      Rate and Rhythm: Regular rhythm. Tachycardia present.   Pulmonary:      Effort: Pulmonary effort is normal. No respiratory distress.      Breath sounds: No stridor. No wheezing, rhonchi or rales.   Chest:      Chest wall: No tenderness.   Abdominal:      General: Abdomen is flat.      Palpations: " Abdomen is soft.   Musculoskeletal:         General: Normal range of motion.      Cervical back: No rigidity.   Skin:     General: Skin is warm and dry.   Neurological:      General: No focal deficit present.      Mental Status: She is alert and oriented to person, place, and time. Mental status is at baseline.   Psychiatric:         Behavior: Behavior normal.         Judgment: Judgment normal.         Assessment/Plan:   Agustín was seen today for pharyngitis.    Diagnoses and all orders for this visit:    Viral pharyngitis  -     sore throat spray (CHLORASEPTIC) 1.4 % Liquid; Use 1 Spray in the mouth or throat every 2 hours as needed (pain) for up to 5 days.    Myalgia due to viral infection  -     cyclobenzaprine (FLEXERIL) 10 mg Tab; Take 1 Tablet by mouth at bedtime as needed for Muscle Spasms for up to 5 days.    Viral upper respiratory tract infection with cough  -     benzonatate (TESSALON) 100 MG Cap; Take 1 Capsule by mouth 3 times a day as needed for Cough.    Congestion of nasal sinus  -     guaiFENesin ER (MUCINEX) 600 MG TABLET SR 12 HR; Take 1 Tablet by mouth every 12 hours for 14 days.     Based on history of presenting illness, review of systems and physical exam findings, most likely etiology of sore throat is viral pharyngitis.   discussed symptomatic management with pharmacotherapy and over-the-counter medications.  On my exam I do not see any signs or symptoms consistent with a bacterial infection currently requiring oral antibiotics.  Discussed the risks the benefits and the indications of all new medications prescribed today.  Strict return and ED precautions were discussed and all questions were answered.     Differential diagnosis, natural history, supportive care, and indications for immediate follow-up discussed.    Advised the patient to follow-up with the primary care physician for recheck, reevaluation, and consideration of further management.    Please note that this dictation was  created using voice recognition software. I have made reasonable attempt to correct obvious errors, but I expect that there are errors of grammar and possibly content that I did not discover before finalizing the note.    This note was electronically signed by Mary Garcia MD PhD

## (undated) DEVICE — TUBING PATIENT W/CONNECTOR REDEUCE (1EA)

## (undated) DEVICE — CANISTER SUCTION RIGID RED 1500CC (40EA/CA)

## (undated) DEVICE — PACK KNEE ARTHROSCOPY SM OR - (2EA/CA)

## (undated) DEVICE — TUBE CONNECTING SUCTION - CLEAR PLASTIC STERILE 72 IN (50EA/CA)

## (undated) DEVICE — MASK, LARYNGEAL AIRWAY #4

## (undated) DEVICE — MASK ANESTHESIA ADULT  - (100/CA)

## (undated) DEVICE — TUBING PUMP WITH CONNECTOR REDEUCE (1EA)

## (undated) DEVICE — PACK MINOR BASIN - (2EA/CA)

## (undated) DEVICE — ARTHROWAND TURBOVAC 3.5/90 SCT

## (undated) DEVICE — GLOVE, LITE (PAIR)

## (undated) DEVICE — PROTECTOR ULNA NERVE - (36PR/CA)

## (undated) DEVICE — KIT ROOM DECONTAMINATION

## (undated) DEVICE — DRESSING ABDOMINAL PAD STERILE 8 X 10" (360EA/CA)"

## (undated) DEVICE — PADDING CAST 6 IN STERILE - 6 X 4 YDS (24/CA)

## (undated) DEVICE — WATER IRRIGATION STERILE 1000ML (12EA/CA)

## (undated) DEVICE — PEN SKIN MARKER W/RULER - (50EA/BX)

## (undated) DEVICE — GLOVE SZ 7 BIOGEL PI MICRO - PF LF (50PR/BX 4BX/CA)

## (undated) DEVICE — GLOVE BIOGEL INDICATOR SZ 8 SURGICAL PF LTX - (50/BX 4BX/CA)

## (undated) DEVICE — DRAPE U ORTHOPEDIC - (10/BX)

## (undated) DEVICE — DRL BIT4.5 STR ENDOSCPC CANN

## (undated) DEVICE — SUTURE 2-0 VICRYL PLUS CT-1 36 (36PK/BX)"

## (undated) DEVICE — GLOVE BIOGEL SZ 7.5 SURGICAL PF LTX - (50PR/BX 4BX/CA)

## (undated) DEVICE — SUCTION INSTRUMENT YANKAUER BULBOUS TIP W/O VENT (50EA/CA)

## (undated) DEVICE — GOWN SURGICAL X-LARGE ULTRA - FILM-REINFORCED (20/CA)

## (undated) DEVICE — SUTURE GENERAL

## (undated) DEVICE — CHLORAPREP 26 ML APPLICATOR - ORANGE TINT(25/CA)

## (undated) DEVICE — DRAPE LARGE 3 QUARTER - (20/CA)

## (undated) DEVICE — KIT ANESTHESIA W/CIRCUIT & 3/LT BAG W/FILTER (20EA/CA)

## (undated) DEVICE — SUTURE 3-0 VICRYL PLUS CT-1 - 36 INCH (36/BX)

## (undated) DEVICE — HUMID-VENT HEAT AND MOISTURE EXCHANGE- (50/BX)

## (undated) DEVICE — SENSOR SPO2 NEO LNCS ADHESIVE (20/BX) SEE USER NOTES

## (undated) DEVICE — ELECTRODE DUAL RETURN W/ CORD - (50/PK)

## (undated) DEVICE — TOURNIQUET, STERILE 34 (BLUE)

## (undated) DEVICE — GLOVE SZ 7.5 BIOGEL PI MICRO - PF LF (50PR/BX)

## (undated) DEVICE — ELECTRODE 850 FOAM ADHESIVE - HYDROGEL RADIOTRNSPRNT (50/PK)

## (undated) DEVICE — SHAVER4.0 AGGRESSIVE + FORMLA (5EA/BX)

## (undated) DEVICE — FIBERWIRE 2.0 (12EA/BX)

## (undated) DEVICE — GOWN WARMING STANDARD FLEX - (30/CA)

## (undated) DEVICE — SUTURE 3-0 MONOCRYL PLUS PS-1 - 27 INCH (36/BX)

## (undated) DEVICE — SUTURE 3-0 ETHILON FS-1 - (36/BX) 30 INCH

## (undated) DEVICE — SHAVER, 5.5 RESECTOR

## (undated) DEVICE — NEPTUNE 4 PORT MANIFOLD - (20/PK)

## (undated) DEVICE — SODIUM CHL. IRRIGATION 0.9% 3000ML (4EA/CA 65CA/PF)